# Patient Record
Sex: FEMALE | Race: OTHER | Employment: UNEMPLOYED | URBAN - METROPOLITAN AREA
[De-identification: names, ages, dates, MRNs, and addresses within clinical notes are randomized per-mention and may not be internally consistent; named-entity substitution may affect disease eponyms.]

---

## 2024-02-12 ENCOUNTER — HOSPITAL ENCOUNTER (EMERGENCY)
Age: 44
Discharge: HOME | End: 2024-02-12
Attending: EMERGENCY MEDICINE

## 2024-02-12 ENCOUNTER — APPOINTMENT (OUTPATIENT)
Dept: ULTRASOUND IMAGING | Age: 44
End: 2024-02-12

## 2024-02-12 VITALS
SYSTOLIC BLOOD PRESSURE: 114 MMHG | DIASTOLIC BLOOD PRESSURE: 81 MMHG | HEART RATE: 90 BPM | RESPIRATION RATE: 17 BRPM | OXYGEN SATURATION: 100 % | TEMPERATURE: 99 F

## 2024-02-12 DIAGNOSIS — E11.9 TYPE 2 DIABETES MELLITUS WITHOUT COMPLICATION, WITHOUT LONG-TERM CURRENT USE OF INSULIN (HCC): ICD-10-CM

## 2024-02-12 DIAGNOSIS — D64.9 ANEMIA, UNSPECIFIED TYPE: ICD-10-CM

## 2024-02-12 DIAGNOSIS — R55 SYNCOPE AND COLLAPSE: ICD-10-CM

## 2024-02-12 DIAGNOSIS — R60.0 BILATERAL LOWER EXTREMITY EDEMA: Primary | ICD-10-CM

## 2024-02-12 LAB
ALBUMIN SERPL-MCNC: 1.9 G/DL (ref 3.5–5)
ALBUMIN/GLOB SERPL: 0.5 (ref 0.4–1.6)
ALP SERPL-CCNC: 99 U/L (ref 50–136)
ALT SERPL-CCNC: 15 U/L (ref 12–65)
ANION GAP SERPL CALC-SCNC: 6 MMOL/L (ref 2–11)
AST SERPL-CCNC: 12 U/L (ref 15–37)
BASOPHILS # BLD: 0 K/UL (ref 0–0.2)
BASOPHILS NFR BLD: 1 % (ref 0–2)
BILIRUB SERPL-MCNC: 0.2 MG/DL (ref 0.2–1.1)
BUN SERPL-MCNC: 19 MG/DL (ref 6–23)
CALCIUM SERPL-MCNC: 8.4 MG/DL (ref 8.3–10.4)
CHLORIDE SERPL-SCNC: 107 MMOL/L (ref 103–113)
CO2 SERPL-SCNC: 26 MMOL/L (ref 21–32)
CREAT SERPL-MCNC: 0.7 MG/DL (ref 0.6–1)
DIFFERENTIAL METHOD BLD: ABNORMAL
EKG ATRIAL RATE: 94 BPM
EKG DIAGNOSIS: NORMAL
EKG P AXIS: 37 DEGREES
EKG P-R INTERVAL: 172 MS
EKG Q-T INTERVAL: 380 MS
EKG QRS DURATION: 86 MS
EKG QTC CALCULATION (BAZETT): 475 MS
EKG R AXIS: 104 DEGREES
EKG T AXIS: 19 DEGREES
EKG VENTRICULAR RATE: 94 BPM
EOSINOPHIL # BLD: 0.1 K/UL (ref 0–0.8)
EOSINOPHIL NFR BLD: 1 % (ref 0.5–7.8)
ERYTHROCYTE [DISTWIDTH] IN BLOOD BY AUTOMATED COUNT: 13.1 % (ref 11.9–14.6)
GLOBULIN SER CALC-MCNC: 4 G/DL (ref 2.8–4.5)
GLUCOSE SERPL-MCNC: 288 MG/DL (ref 65–100)
HCT VFR BLD AUTO: 28.4 % (ref 35.8–46.3)
HGB BLD-MCNC: 9.1 G/DL (ref 11.7–15.4)
IMM GRANULOCYTES # BLD AUTO: 0 K/UL (ref 0–0.5)
IMM GRANULOCYTES NFR BLD AUTO: 0 % (ref 0–5)
LYMPHOCYTES # BLD: 2.3 K/UL (ref 0.5–4.6)
LYMPHOCYTES NFR BLD: 27 % (ref 13–44)
MCH RBC QN AUTO: 27.7 PG (ref 26.1–32.9)
MCHC RBC AUTO-ENTMCNC: 32 G/DL (ref 31.4–35)
MCV RBC AUTO: 86.6 FL (ref 82–102)
MONOCYTES # BLD: 0.5 K/UL (ref 0.1–1.3)
MONOCYTES NFR BLD: 6 % (ref 4–12)
NEUTS SEG # BLD: 5.7 K/UL (ref 1.7–8.2)
NEUTS SEG NFR BLD: 65 % (ref 43–78)
NRBC # BLD: 0 K/UL (ref 0–0.2)
NT PRO BNP: 1001 PG/ML (ref 5–125)
PLATELET # BLD AUTO: 271 K/UL (ref 150–450)
PMV BLD AUTO: 10.6 FL (ref 9.4–12.3)
POTASSIUM SERPL-SCNC: 3.7 MMOL/L (ref 3.5–5.1)
PROT SERPL-MCNC: 5.9 G/DL (ref 6.3–8.2)
RBC # BLD AUTO: 3.28 M/UL (ref 4.05–5.2)
SODIUM SERPL-SCNC: 139 MMOL/L (ref 136–146)
WBC # BLD AUTO: 8.6 K/UL (ref 4.3–11.1)

## 2024-02-12 PROCEDURE — 93970 EXTREMITY STUDY: CPT

## 2024-02-12 PROCEDURE — 85025 COMPLETE CBC W/AUTO DIFF WBC: CPT

## 2024-02-12 PROCEDURE — 93005 ELECTROCARDIOGRAM TRACING: CPT | Performed by: EMERGENCY MEDICINE

## 2024-02-12 PROCEDURE — 93970 EXTREMITY STUDY: CPT | Performed by: RADIOLOGY

## 2024-02-12 PROCEDURE — 99284 EMERGENCY DEPT VISIT MOD MDM: CPT

## 2024-02-12 PROCEDURE — 93010 ELECTROCARDIOGRAM REPORT: CPT | Performed by: INTERNAL MEDICINE

## 2024-02-12 PROCEDURE — 80053 COMPREHEN METABOLIC PANEL: CPT

## 2024-02-12 PROCEDURE — 83880 ASSAY OF NATRIURETIC PEPTIDE: CPT

## 2024-02-12 RX ORDER — HYDROCHLOROTHIAZIDE 25 MG/1
25 TABLET ORAL EVERY MORNING
Qty: 30 TABLET | Refills: 1 | Status: SHIPPED | OUTPATIENT
Start: 2024-02-12 | End: 2024-04-12

## 2024-02-12 NOTE — PROGRESS NOTES
Patient/caregivers speak [place]  as their preferred language for their healthcare communication. For safe communication, use the [facility]  carts or call:    Senior /Navigator Dee Trevino at [phone] or   [employer] phone services for [facility] at [phone]    General phone: [phone] ( [phone])  Email: [email or web address]    Always document the use of interpreting services ('s ID number) in your clinical notes.    Our interpreters are available for team members working with limited  English proficient (LEP) patients remotely, via phone or video or in person (if needed for special cases).    When using family members to interpret, for the safety of the patient and protection of the communication of both our patient and [facility] staff the VRI or telephonic  should remain on the line to monitor that all communication is accurate and complete. The  should be instructed to notify [facility] staff immediately if there are any inaccuracies.         Thank you,        Dee JAIME  Senior /Navigator

## 2024-02-12 NOTE — ED PROVIDER NOTES
General: No focal deficit present.      Mental Status: She is alert.          Procedures     Procedures    Orders Placed This Encounter   Procedures    CBC with Auto Differential    Brain Natriuretic Peptide    Comprehensive Metabolic Panel    [facility]    EKG 12 Lead    Insert peripheral IV    Vascular duplex lower extremity venous bilateral        Medications given during this emergency department visit:  Medications - No data to display    Discharge Medication List as of 2/12/2024  2:37 PM        START taking these medications    Details   hydroCHLOROthiazide (HYDRODIURIL) 25 MG tablet Take 1 tablet by mouth every morning, Disp-30 tablet, R-1Print      metFORMIN (GLUCOPHAGE) 500 MG tablet Take 1 tablet by mouth 2 times daily (with meals), Disp-60 tablet, R-1Print              No past medical history on file.     No past surgical history on file.     Social History     Socioeconomic History    Marital status:         Discharge Medication List as of 2/12/2024  2:37 PM           Results for orders placed or performed during the hospital encounter of 02/12/24   CBC with Auto Differential   Result Value Ref Range    WBC 8.6 4.3 - 11.1 K/uL    RBC 3.28 (L) 4.05 - 5.2 M/uL    Hemoglobin 9.1 (L) 11.7 - 15.4 g/dL    Hematocrit 28.4 (L) 35.8 - 46.3 %    MCV 86.6 82 - 102 FL    MCH 27.7 26.1 - 32.9 PG    MCHC 32.0 31.4 - 35.0 g/dL    RDW 13.1 11.9 - 14.6 %    Platelets 271 150 - 450 K/uL    MPV 10.6 9.4 - 12.3 FL    nRBC 0.00 0.0 - 0.2 K/uL    Differential Type AUTOMATED      Neutrophils % 65 43 - 78 %    Lymphocytes % 27 13 - 44 %    Monocytes % 6 4.0 - 12.0 %    Eosinophils % 1 0.5 - 7.8 %    Basophils % 1 0.0 - 2.0 %    Immature Granulocytes 0 0.0 - 5.0 %    Neutrophils Absolute 5.7 1.7 - 8.2 K/UL    Lymphocytes Absolute 2.3 0.5 - 4.6 K/UL    Monocytes Absolute 0.5 0.1 - 1.3 K/UL    Eosinophils Absolute 0.1 0.0 - 0.8 K/UL    Basophils Absolute 0.0 0.0 - 0.2 K/UL

## 2024-02-12 NOTE — ED NOTES
Mail order faxed in request for medication refill. Medication(s) set up as pending orders from medication list.    Preferred pharmacy has been set up and verified            Preferred contact number#  [facility] [phone]    Prescription request for 90 day supply directions and signature required.       Needs bed for I&D     Megan Quevedo, RN  02/12/24 6000

## 2024-02-12 NOTE — DISCHARGE INSTRUCTIONS
Medications as directed to lower blood sugar.  Consider multiple vitamin with iron regarding your anemia.  Important to call for follow-up appointments.

## 2024-02-12 NOTE — ED NOTES
I have reviewed discharge instructions with the patient and caregiver.  The patient and caregiver verbalized understanding.    Patient left ED via Discharge Method: ambulatory to Home with (family).    Opportunity for questions and clarification provided.       Patient given 2 scripts.   No esign      To continue your aftercare when you leave the hospital, you may receive an automated call from our care team to check in on how you are doing.  This is a free service and part of our promise to provide the best care and service to meet your aftercare needs.” If you have questions, or wish to unsubscribe from this service please call [phone].  Thank you for Choosing our [facility] Emergency Department.       Cassidy Cali, RN  02/12/24 4459

## 2024-02-12 NOTE — ED TRIAGE NOTES
Pt reports bilateral foot pain and swelling that began 2 weeks ago.  Pt reports intermittent cramps in calves.

## 2024-02-13 ENCOUNTER — TELEPHONE (OUTPATIENT)
Dept: INTERNAL MEDICINE CLINIC | Facility: CLINIC | Age: 44
End: 2024-02-13

## 2024-02-13 NOTE — TELEPHONE ENCOUNTER
Made the first attempt to contact the patient using the phone number listed in chart to schedule appointment with our office today. Called through Language services Session Code [id number],  [id number]/ No answer  called x 2 alternate number

## 2024-02-14 NOTE — TELEPHONE ENCOUNTER
Made the second attempt to contact the patient using the phone number listed in chart to schedule appointment with our office today. Called through Language services Session Code [id number]   ID [id number] Patient number not in service/ alternate number no answer unable to LVM

## 2024-02-15 NOTE — CARE COORDINATION
SW attempted to contact patient to advise of cardiology appointment Dr. Waggoner placed yesterday. No answer. Unable to LM.     Mary Queen LMSW    [facility]

## 2024-02-15 NOTE — TELEPHONE ENCOUNTER
Made the third attempt to contact the patient using the phone number listed in chart to schedule appointment with our office today. Called through Language services Session Code [id number]   ID [id number] Patient number not in service/ alternate number no answer unable to LVM

## 2025-06-19 ENCOUNTER — TRANSCRIBE ORDERS (OUTPATIENT)
Dept: SCHEDULING | Age: 45
End: 2025-06-19

## 2025-06-19 DIAGNOSIS — R14.0 ABDOMINAL DISTENSION (GASEOUS): Primary | ICD-10-CM

## 2025-06-25 ENCOUNTER — HOSPITAL ENCOUNTER (INPATIENT)
Age: 45
LOS: 6 days | Discharge: HOME | DRG: 683 | End: 2025-07-01
Attending: EMERGENCY MEDICINE | Admitting: FAMILY MEDICINE
Payer: MEDICAID

## 2025-06-25 ENCOUNTER — APPOINTMENT (OUTPATIENT)
Dept: ULTRASOUND IMAGING | Age: 45
DRG: 683 | End: 2025-06-25
Payer: MEDICAID

## 2025-06-25 ENCOUNTER — APPOINTMENT (OUTPATIENT)
Dept: CT IMAGING | Age: 45
DRG: 683 | End: 2025-06-25
Payer: MEDICAID

## 2025-06-25 DIAGNOSIS — D64.9 ACUTE ON CHRONIC ANEMIA: ICD-10-CM

## 2025-06-25 DIAGNOSIS — R60.1 ANASARCA: ICD-10-CM

## 2025-06-25 DIAGNOSIS — N28.9 ACUTE RENAL INSUFFICIENCY: Primary | ICD-10-CM

## 2025-06-25 DIAGNOSIS — R60.0 EDEMA OF LEFT UPPER EXTREMITY: ICD-10-CM

## 2025-06-25 PROBLEM — I10 HTN (HYPERTENSION): Status: ACTIVE | Noted: 2025-06-25

## 2025-06-25 PROBLEM — N17.9 AKI (ACUTE KIDNEY INJURY): Status: ACTIVE | Noted: 2025-06-25

## 2025-06-25 PROBLEM — E11.9 DIABETES MELLITUS, TYPE II (HCC): Status: ACTIVE | Noted: 2025-06-25

## 2025-06-25 PROBLEM — E87.20 METABOLIC ACIDOSIS: Status: ACTIVE | Noted: 2025-06-25

## 2025-06-25 LAB
ALBUMIN SERPL-MCNC: 1.4 G/DL (ref 3.5–5)
ALBUMIN/GLOB SERPL: 0.3 (ref 1–1.9)
ALP SERPL-CCNC: 102 U/L (ref 35–104)
ALT SERPL-CCNC: 17 U/L (ref 8–45)
ANION GAP SERPL CALC-SCNC: 7 MMOL/L (ref 7–16)
APPEARANCE UR: ABNORMAL
AST SERPL-CCNC: 17 U/L (ref 15–37)
BACTERIA URNS QL MICRO: ABNORMAL /HPF
BASOPHILS # BLD: 0.04 K/UL (ref 0–0.2)
BASOPHILS NFR BLD: 0.5 % (ref 0–2)
BILIRUB SERPL-MCNC: <0.2 MG/DL (ref 0–1.2)
BILIRUB UR QL: NEGATIVE
BUN SERPL-MCNC: 43 MG/DL (ref 6–23)
CALCIUM SERPL-MCNC: 7.3 MG/DL (ref 8.8–10.2)
CASTS URNS QL MICRO: 0 /LPF
CHLORIDE SERPL-SCNC: 113 MMOL/L (ref 98–107)
CO2 SERPL-SCNC: 15 MMOL/L (ref 20–29)
COLOR UR: ABNORMAL
CREAT SERPL-MCNC: 2.22 MG/DL (ref 0.6–1.1)
CRYSTALS URNS QL MICRO: ABNORMAL /LPF
DIFFERENTIAL METHOD BLD: ABNORMAL
EOSINOPHIL # BLD: 0.24 K/UL (ref 0–0.8)
EOSINOPHIL NFR BLD: 3.2 % (ref 0.5–7.8)
EPI CELLS #/AREA URNS HPF: ABNORMAL /HPF
ERYTHROCYTE [DISTWIDTH] IN BLOOD BY AUTOMATED COUNT: 12.8 % (ref 11.9–14.6)
EST. AVERAGE GLUCOSE BLD GHB EST-MCNC: 110 MG/DL
GLOBULIN SER CALC-MCNC: 4.1 G/DL (ref 2.3–3.5)
GLUCOSE SERPL-MCNC: 89 MG/DL (ref 70–99)
GLUCOSE UR STRIP.AUTO-MCNC: NEGATIVE MG/DL
HBA1C MFR BLD: 5.5 % (ref 0–5.6)
HCT VFR BLD AUTO: 20.3 % (ref 35.8–46.3)
HCT VFR BLD AUTO: 24.3 % (ref 35.8–46.3)
HGB BLD-MCNC: 6.5 G/DL (ref 11.7–15.4)
HGB BLD-MCNC: 7.8 G/DL (ref 11.7–15.4)
HGB UR QL STRIP: ABNORMAL
HISTORY CHECK: NORMAL
IMM GRANULOCYTES # BLD AUTO: 0.03 K/UL (ref 0–0.5)
IMM GRANULOCYTES NFR BLD AUTO: 0.4 % (ref 0–5)
IRON SATN MFR SERPL: 23 % (ref 20–50)
IRON SERPL-MCNC: 28 UG/DL (ref 35–100)
KETONES UR QL STRIP.AUTO: NEGATIVE MG/DL
LEUKOCYTE ESTERASE UR QL STRIP.AUTO: ABNORMAL
LIPASE SERPL-CCNC: 24 U/L (ref 13–60)
LYMPHOCYTES # BLD: 2.06 K/UL (ref 0.5–4.6)
LYMPHOCYTES NFR BLD: 27.2 % (ref 13–44)
MCH RBC QN AUTO: 30.8 PG (ref 26.1–32.9)
MCHC RBC AUTO-ENTMCNC: 32 G/DL (ref 31.4–35)
MCV RBC AUTO: 96.2 FL (ref 82–102)
MONOCYTES # BLD: 0.62 K/UL (ref 0.1–1.3)
MONOCYTES NFR BLD: 8.2 % (ref 4–12)
MUCOUS THREADS URNS QL MICRO: 0 /LPF
NEUTS SEG # BLD: 4.57 K/UL (ref 1.7–8.2)
NEUTS SEG NFR BLD: 60.5 % (ref 43–78)
NITRITE UR QL STRIP.AUTO: NEGATIVE
NRBC # BLD: 0 K/UL (ref 0–0.2)
OTHER OBSERVATIONS: ABNORMAL
PH UR STRIP: 5 (ref 5–9)
PLATELET # BLD AUTO: 299 K/UL (ref 150–450)
PMV BLD AUTO: 10 FL (ref 9.4–12.3)
POTASSIUM SERPL-SCNC: 4.6 MMOL/L (ref 3.5–5.1)
PROT SERPL-MCNC: 5.4 G/DL (ref 6.3–8.2)
PROT UR STRIP-MCNC: 300 MG/DL
RBC # BLD AUTO: 2.11 M/UL (ref 4.05–5.2)
RBC #/AREA URNS HPF: ABNORMAL /HPF
SODIUM SERPL-SCNC: 134 MMOL/L (ref 136–145)
SP GR UR REFRACTOMETRY: 1.01 (ref 1–1.02)
TIBC SERPL-MCNC: 118 UG/DL (ref 240–450)
UIBC SERPL-MCNC: 90.6 UG/DL (ref 112–347)
URINE CULTURE IF INDICATED: ABNORMAL
UROBILINOGEN UR QL STRIP.AUTO: 0.2 EU/DL (ref 0.2–1)
WBC # BLD AUTO: 7.6 K/UL (ref 4.3–11.1)
WBC URNS QL MICRO: ABNORMAL /HPF
YEAST URNS QL MICRO: ABNORMAL

## 2025-06-25 PROCEDURE — 6370000000 HC RX 637 (ALT 250 FOR IP): Performed by: FAMILY MEDICINE

## 2025-06-25 PROCEDURE — 2500000003 HC RX 250 WO HCPCS: Performed by: FAMILY MEDICINE

## 2025-06-25 PROCEDURE — 87186 SC STD MICRODIL/AGAR DIL: CPT

## 2025-06-25 PROCEDURE — 85018 HEMOGLOBIN: CPT

## 2025-06-25 PROCEDURE — 99285 EMERGENCY DEPT VISIT HI MDM: CPT

## 2025-06-25 PROCEDURE — 2580000003 HC RX 258: Performed by: EMERGENCY MEDICINE

## 2025-06-25 PROCEDURE — 86850 RBC ANTIBODY SCREEN: CPT

## 2025-06-25 PROCEDURE — 1100000003 HC PRIVATE W/ TELEMETRY

## 2025-06-25 PROCEDURE — 82570 ASSAY OF URINE CREATININE: CPT

## 2025-06-25 PROCEDURE — 85025 COMPLETE CBC W/AUTO DIFF WBC: CPT

## 2025-06-25 PROCEDURE — 83036 HEMOGLOBIN GLYCOSYLATED A1C: CPT

## 2025-06-25 PROCEDURE — 96360 HYDRATION IV INFUSION INIT: CPT

## 2025-06-25 PROCEDURE — 36415 COLL VENOUS BLD VENIPUNCTURE: CPT

## 2025-06-25 PROCEDURE — 84156 ASSAY OF PROTEIN URINE: CPT

## 2025-06-25 PROCEDURE — 86901 BLOOD TYPING SEROLOGIC RH(D): CPT

## 2025-06-25 PROCEDURE — 80053 COMPREHEN METABOLIC PANEL: CPT

## 2025-06-25 PROCEDURE — P9016 RBC LEUKOCYTES REDUCED: HCPCS

## 2025-06-25 PROCEDURE — 87088 URINE BACTERIA CULTURE: CPT

## 2025-06-25 PROCEDURE — 86900 BLOOD TYPING SEROLOGIC ABO: CPT

## 2025-06-25 PROCEDURE — 6360000002 HC RX W HCPCS: Performed by: FAMILY MEDICINE

## 2025-06-25 PROCEDURE — 83690 ASSAY OF LIPASE: CPT

## 2025-06-25 PROCEDURE — 85014 HEMATOCRIT: CPT

## 2025-06-25 PROCEDURE — 86923 COMPATIBILITY TEST ELECTRIC: CPT

## 2025-06-25 PROCEDURE — 83550 IRON BINDING TEST: CPT

## 2025-06-25 PROCEDURE — 83540 ASSAY OF IRON: CPT

## 2025-06-25 PROCEDURE — 74176 CT ABD & PELVIS W/O CONTRAST: CPT

## 2025-06-25 PROCEDURE — 87086 URINE CULTURE/COLONY COUNT: CPT

## 2025-06-25 PROCEDURE — 81001 URINALYSIS AUTO W/SCOPE: CPT

## 2025-06-25 PROCEDURE — 2580000003 HC RX 258: Performed by: FAMILY MEDICINE

## 2025-06-25 PROCEDURE — 36430 TRANSFUSION BLD/BLD COMPNT: CPT

## 2025-06-25 PROCEDURE — 30233N1 TRANSFUSION OF NONAUTOLOGOUS RED BLOOD CELLS INTO PERIPHERAL VEIN, PERCUTANEOUS APPROACH: ICD-10-PCS | Performed by: INTERNAL MEDICINE

## 2025-06-25 RX ORDER — MAGNESIUM SULFATE IN WATER 40 MG/ML
2000 INJECTION, SOLUTION INTRAVENOUS PRN
Status: DISCONTINUED | OUTPATIENT
Start: 2025-06-25 | End: 2025-07-01 | Stop reason: HOSPADM

## 2025-06-25 RX ORDER — ACETAMINOPHEN 325 MG/1
650 TABLET ORAL EVERY 6 HOURS PRN
Status: DISCONTINUED | OUTPATIENT
Start: 2025-06-25 | End: 2025-07-01 | Stop reason: HOSPADM

## 2025-06-25 RX ORDER — MAGNESIUM HYDROXIDE/ALUMINUM HYDROXICE/SIMETHICONE 120; 1200; 1200 MG/30ML; MG/30ML; MG/30ML
30 SUSPENSION ORAL EVERY 6 HOURS PRN
Status: DISCONTINUED | OUTPATIENT
Start: 2025-06-25 | End: 2025-07-01 | Stop reason: HOSPADM

## 2025-06-25 RX ORDER — FERROUS SULFATE 325(65) MG
325 TABLET ORAL
Status: ON HOLD | COMMUNITY
End: 2025-07-01

## 2025-06-25 RX ORDER — SODIUM CHLORIDE 9 MG/ML
INJECTION, SOLUTION INTRAVENOUS PRN
Status: DISCONTINUED | OUTPATIENT
Start: 2025-06-25 | End: 2025-07-01 | Stop reason: HOSPADM

## 2025-06-25 RX ORDER — ONDANSETRON 2 MG/ML
4 INJECTION INTRAMUSCULAR; INTRAVENOUS EVERY 6 HOURS PRN
Status: DISCONTINUED | OUTPATIENT
Start: 2025-06-25 | End: 2025-06-25

## 2025-06-25 RX ORDER — SODIUM BICARBONATE 650 MG/1
650 TABLET ORAL 4 TIMES DAILY
Status: DISCONTINUED | OUTPATIENT
Start: 2025-06-25 | End: 2025-06-25

## 2025-06-25 RX ORDER — POTASSIUM CHLORIDE 7.45 MG/ML
10 INJECTION INTRAVENOUS PRN
Status: DISCONTINUED | OUTPATIENT
Start: 2025-06-25 | End: 2025-07-01 | Stop reason: HOSPADM

## 2025-06-25 RX ORDER — SODIUM CHLORIDE 0.9 % (FLUSH) 0.9 %
5-40 SYRINGE (ML) INJECTION PRN
Status: DISCONTINUED | OUTPATIENT
Start: 2025-06-25 | End: 2025-07-01 | Stop reason: HOSPADM

## 2025-06-25 RX ORDER — LISINOPRIL 5 MG/1
2.5 TABLET ORAL DAILY
Status: DISCONTINUED | OUTPATIENT
Start: 2025-06-25 | End: 2025-06-29

## 2025-06-25 RX ORDER — POLYETHYLENE GLYCOL 3350 17 G/17G
17 POWDER, FOR SOLUTION ORAL DAILY PRN
Status: DISCONTINUED | OUTPATIENT
Start: 2025-06-25 | End: 2025-07-01 | Stop reason: HOSPADM

## 2025-06-25 RX ORDER — HYDRALAZINE HYDROCHLORIDE 20 MG/ML
20 INJECTION INTRAMUSCULAR; INTRAVENOUS EVERY 6 HOURS PRN
Status: DISCONTINUED | OUTPATIENT
Start: 2025-06-25 | End: 2025-06-29

## 2025-06-25 RX ORDER — SODIUM CHLORIDE 0.9 % (FLUSH) 0.9 %
5-40 SYRINGE (ML) INJECTION EVERY 12 HOURS SCHEDULED
Status: DISCONTINUED | OUTPATIENT
Start: 2025-06-25 | End: 2025-07-01 | Stop reason: HOSPADM

## 2025-06-25 RX ORDER — ACETAMINOPHEN 650 MG/1
650 SUPPOSITORY RECTAL EVERY 6 HOURS PRN
Status: DISCONTINUED | OUTPATIENT
Start: 2025-06-25 | End: 2025-07-01 | Stop reason: HOSPADM

## 2025-06-25 RX ORDER — BISACODYL 10 MG
10 SUPPOSITORY, RECTAL RECTAL DAILY PRN
Status: DISCONTINUED | OUTPATIENT
Start: 2025-06-25 | End: 2025-07-01 | Stop reason: HOSPADM

## 2025-06-25 RX ORDER — FAMOTIDINE 10 MG
10 TABLET ORAL DAILY PRN
Status: DISCONTINUED | OUTPATIENT
Start: 2025-06-25 | End: 2025-07-01 | Stop reason: HOSPADM

## 2025-06-25 RX ORDER — FUROSEMIDE 10 MG/ML
40 INJECTION INTRAMUSCULAR; INTRAVENOUS DAILY
Status: DISCONTINUED | OUTPATIENT
Start: 2025-06-25 | End: 2025-06-28

## 2025-06-25 RX ORDER — ONDANSETRON 4 MG/1
4 TABLET, ORALLY DISINTEGRATING ORAL EVERY 8 HOURS PRN
Status: DISCONTINUED | OUTPATIENT
Start: 2025-06-25 | End: 2025-06-25

## 2025-06-25 RX ORDER — POTASSIUM CHLORIDE 1500 MG/1
40 TABLET, EXTENDED RELEASE ORAL PRN
Status: DISCONTINUED | OUTPATIENT
Start: 2025-06-25 | End: 2025-07-01 | Stop reason: HOSPADM

## 2025-06-25 RX ORDER — SODIUM CHLORIDE 9 MG/ML
INJECTION, SOLUTION INTRAVENOUS CONTINUOUS
Status: DISCONTINUED | OUTPATIENT
Start: 2025-06-25 | End: 2025-06-25

## 2025-06-25 RX ORDER — 0.9 % SODIUM CHLORIDE 0.9 %
1000 INTRAVENOUS SOLUTION INTRAVENOUS ONCE
Status: COMPLETED | OUTPATIENT
Start: 2025-06-25 | End: 2025-06-25

## 2025-06-25 RX ADMIN — SODIUM CHLORIDE, PRESERVATIVE FREE 10 ML: 5 INJECTION INTRAVENOUS at 22:21

## 2025-06-25 RX ADMIN — FUROSEMIDE 40 MG: 10 INJECTION, SOLUTION INTRAMUSCULAR; INTRAVENOUS at 22:15

## 2025-06-25 RX ADMIN — CEFTRIAXONE SODIUM 2000 MG: 2 INJECTION, POWDER, FOR SOLUTION INTRAMUSCULAR; INTRAVENOUS at 22:15

## 2025-06-25 RX ADMIN — SODIUM CHLORIDE 40 MG: 9 INJECTION INTRAMUSCULAR; INTRAVENOUS; SUBCUTANEOUS at 22:21

## 2025-06-25 RX ADMIN — LISINOPRIL 2.5 MG: 5 TABLET ORAL at 22:15

## 2025-06-25 RX ADMIN — SODIUM CHLORIDE 1000 ML: 0.9 INJECTION, SOLUTION INTRAVENOUS at 17:20

## 2025-06-25 ASSESSMENT — PAIN SCALES - GENERAL
PAINLEVEL_OUTOF10: 0
PAINLEVEL_OUTOF10: 0

## 2025-06-25 ASSESSMENT — PAIN - FUNCTIONAL ASSESSMENT: PAIN_FUNCTIONAL_ASSESSMENT: 0-10

## 2025-06-25 ASSESSMENT — LIFESTYLE VARIABLES
HOW MANY STANDARD DRINKS CONTAINING ALCOHOL DO YOU HAVE ON A TYPICAL DAY: PATIENT DOES NOT DRINK
HOW OFTEN DO YOU HAVE A DRINK CONTAINING ALCOHOL: NEVER

## 2025-06-25 NOTE — H&P
Hospitalist History and Physical   Admit Date:  2025  3:04 PM   Name:  Monserrat Alvarado   Age:  45 y.o.  Sex:  female  :  1980   MRN:  [id number]   [facility]    Presenting/Chief Complaint: Abdominal Pain and Abnormal Lab     Reason(s) for Admission: Symptomatic anemia [D64.9]     History of Present Illness:   Monserrat Alvarado is a 45 y.o. female who presented to the ED for cc abdominal pain, fatigue, and SOB over the past two weeks. Nothing seems to make better or worse. Denies any obvious bleeding    Hg 6.5 from 9.1 in 2024. Denies antiplatelets, anticoagulants, or NSAIDs.     Hx of DM type II, RICHARD, HTN  Assessment & Plan:     Active Problems:    Symptomatic anemia - No obvious bleeding. Iron studies. Remote tele. Trend Hg. 1 unit PRBC ordered in ER. BUN is elevated, but she denies any obvious blood or NSAID use. PPI IV daily and consult GI to see in AM just to ensure no need for endoscopy. Clears today and NPO past midnight.     CARLOS EDUARDO - Holding her jardiance, metformin, and HCTZ. Renal US. Consult Nephrology to see in the AM due to the extent of her CARLOS EDUARDO. Strict Is and Os. Possibly from her anemia? UA pending to look for protein--nephrotic syndrome? Check lipid panel. A1C pending.     Metabolic acidosis - Na bicarb    Anasarca - Possibly from her CARLOS EDUARDO. Holding off on further IV fluids until I have UA back. If this is more concerning of nephrotic syndrome, she will need diuretics    Air in bladder - UA pending. Patient states she is able to void.     DM type II - SS low dose since glucose 89. Check A1C    HTN - PRN hydralazine.     PT/OT evals ordered?  Not ordered; patient not expected to need rehab  Diet: ADULT DIET; Clear Liquid  Diet NPO  VTE prophylaxis: SCD's   Code status: Full Code  Code status discussed: No  Blood consent obtained: No      Non-peripheral Lines and Tubes (if present):             Hospital Problems:  Principal Problem:    Symptomatic anemia  Active Problems:    CARLOS EDUARDO

## 2025-06-25 NOTE — ED TRIAGE NOTES
Patient arrives to ED complaining of abdominal pain/bloating. Patient reports she has history of anemia and takes iron pills. Patient was told by [facility] that she needed a blood transfusion.

## 2025-06-25 NOTE — ED PROVIDER NOTES
Emergency Department Provider Note       [facility] EMERGENCY DEPT   PCP: None, None   Age: 45 y.o.   Sex: female     DISPOSITION Decision To Admit 06/25/2025 06:32:44 PM    ICD-10-CM    1. Acute renal insufficiency  N28.9       2. Acute on chronic anemia  D64.9           Medical Decision Making       ED Course as of 06/25/25 1832 Wed Jun 25, 2025   1529 Patient is a 45-year-old female that comes to the emergency department with complaint of anemia and abdominal pain.  Patient reports that she was seen at [facility] for anemia.  She was started on iron supplementation.  She was told that she needed a blood transfusion so came to the ER.  On arrival she is hemodynamically stable and not in any distress.  Abdominal pain seems to started when she initiated the iron supplements.  This could be related to constipation or other medication side effect.  Workup will be performed for any signs of significant anemia that would require transfusion in the ER, infectious abdominal process, or surgical process. [KAYLI]   1621 Patient's hemoglobin is 6.5.  She has been ordered a unit of packed red blood cells.  Plan was to do a CT GI bleed study but she also has acute renal insufficiency with a creatinine of 2.22 and overall GFR of 27.  This is also acute from 1 year ago.  She has been ordered IV fluids in addition to the blood.  Patient will require admission. [KAYLI]      ED Course User Index  [KAYLI] Steven Rojas, DO     1 or more acute illnesses that pose a threat to life or bodily function.   Discussion with external consultants.  Shared medical decision making was utilized in creating the patients health plan today.  I independently ordered and reviewed each unique test.           I interpreted the CT Scan CT abdomen pelvis negative for any infectious or obstructive process.              History     Patient is a 45-year-old female comes the emergency department for evaluation of anemia and abdominal pain.  Patient reports

## 2025-06-25 NOTE — CONSENT
Informed Consent for Blood Component Transfusion Note    I have discussed with the patient the rationale for blood component transfusion; its benefits in treating or preventing fatigue, organ damage, or death; and its risk which includes mild transfusion reactions, rare risk of blood borne infection, or more serious but rare reactions. I have discussed the alternatives to transfusion, including the risk and consequences of not receiving transfusion. The patient had an opportunity to ask questions and had agreed to proceed with transfusion of blood components.    Electronically signed by Steven Rojas DO on 6/25/25 at 4:08 PM EDT

## 2025-06-25 NOTE — CONSENT
Informed Consent for Blood Component Transfusion Note    I have discussed with the patient the rationale for blood component transfusion; its benefits in treating or preventing fatigue, organ damage, or death; and its risk which includes mild transfusion reactions, rare risk of blood borne infection, or more serious but rare reactions. I have discussed the alternatives to transfusion, including the risk and consequences of not receiving transfusion. The patient had an opportunity to ask questions and had agreed to proceed with transfusion of blood components.    Daughter also in room    Electronically signed by BETTY APONTE DO on 6/25/25 at 6:36 PM EDT

## 2025-06-26 ENCOUNTER — APPOINTMENT (OUTPATIENT)
Dept: ULTRASOUND IMAGING | Age: 45
DRG: 683 | End: 2025-06-26
Payer: MEDICAID

## 2025-06-26 PROBLEM — N28.9 ACUTE RENAL INSUFFICIENCY: Status: ACTIVE | Noted: 2025-06-26

## 2025-06-26 LAB
ABO + RH BLD: NORMAL
ANION GAP SERPL CALC-SCNC: 9 MMOL/L (ref 7–16)
BASOPHILS # BLD: 0.04 K/UL (ref 0–0.2)
BASOPHILS NFR BLD: 0.5 % (ref 0–2)
BLD PROD TYP BPU: NORMAL
BLOOD BANK BLOOD PRODUCT EXPIRATION DATE: NORMAL
BLOOD BANK DISPENSE STATUS: NORMAL
BLOOD BANK ISBT PRODUCT BLOOD TYPE: 5100
BLOOD BANK PRODUCT CODE: NORMAL
BLOOD BANK UNIT TYPE AND RH: NORMAL
BLOOD GROUP ANTIBODIES SERPL: NORMAL
BPU ID: NORMAL
BUN SERPL-MCNC: 42 MG/DL (ref 6–23)
CALCIUM SERPL-MCNC: 7.2 MG/DL (ref 8.8–10.2)
CHLORIDE SERPL-SCNC: 114 MMOL/L (ref 98–107)
CHOLEST SERPL-MCNC: 221 MG/DL (ref 0–200)
CO2 SERPL-SCNC: 13 MMOL/L (ref 20–29)
CREAT SERPL-MCNC: 2.01 MG/DL (ref 0.6–1.1)
CREAT UR-MCNC: 40.1 MG/DL (ref 28–217)
CROSSMATCH RESULT: NORMAL
DIFFERENTIAL METHOD BLD: ABNORMAL
EOSINOPHIL # BLD: 0.21 K/UL (ref 0–0.8)
EOSINOPHIL NFR BLD: 2.5 % (ref 0.5–7.8)
ERYTHROCYTE [DISTWIDTH] IN BLOOD BY AUTOMATED COUNT: 14.5 % (ref 11.9–14.6)
FERRITIN SERPL-MCNC: 130 NG/ML (ref 8–388)
FOLATE SERPL-MCNC: 6.3 NG/ML (ref 3.1–17.5)
GLUCOSE BLD STRIP.AUTO-MCNC: 122 MG/DL (ref 65–100)
GLUCOSE BLD STRIP.AUTO-MCNC: 97 MG/DL (ref 65–100)
GLUCOSE SERPL-MCNC: 83 MG/DL (ref 70–99)
HAV IGM SER QL: NONREACTIVE
HBV CORE IGM SER QL: NONREACTIVE
HBV SURFACE AG SER QL: NONREACTIVE
HCT VFR BLD AUTO: 26.7 % (ref 35.8–46.3)
HCV AB SER QL: NONREACTIVE
HDLC SERPL-MCNC: 47 MG/DL (ref 40–60)
HDLC SERPL: 4.7 (ref 0–5)
HGB BLD-MCNC: 8.4 G/DL (ref 11.7–15.4)
IMM GRANULOCYTES # BLD AUTO: 0.03 K/UL (ref 0–0.5)
IMM GRANULOCYTES NFR BLD AUTO: 0.4 % (ref 0–5)
LDLC SERPL CALC-MCNC: 142 MG/DL (ref 0–100)
LYMPHOCYTES # BLD: 1.7 K/UL (ref 0.5–4.6)
LYMPHOCYTES NFR BLD: 20.5 % (ref 13–44)
MCH RBC QN AUTO: 29.7 PG (ref 26.1–32.9)
MCHC RBC AUTO-ENTMCNC: 31.5 G/DL (ref 31.4–35)
MCV RBC AUTO: 94.3 FL (ref 82–102)
MONOCYTES # BLD: 0.57 K/UL (ref 0.1–1.3)
MONOCYTES NFR BLD: 6.9 % (ref 4–12)
NEUTS SEG # BLD: 5.75 K/UL (ref 1.7–8.2)
NEUTS SEG NFR BLD: 69.2 % (ref 43–78)
NRBC # BLD: 0 K/UL (ref 0–0.2)
PLATELET # BLD AUTO: 309 K/UL (ref 150–450)
PMV BLD AUTO: 9.7 FL (ref 9.4–12.3)
POTASSIUM SERPL-SCNC: 4.3 MMOL/L (ref 3.5–5.1)
PROT UR-MCNC: >600 MG/DL
PROT/CREAT UR-RTO: NORMAL
RBC # BLD AUTO: 2.83 M/UL (ref 4.05–5.2)
SERVICE CMNT-IMP: ABNORMAL
SERVICE CMNT-IMP: NORMAL
SODIUM SERPL-SCNC: 136 MMOL/L (ref 136–145)
SPECIMEN EXP DATE BLD: NORMAL
T PALLIDUM AB SER QL IA: NONREACTIVE
TRIGL SERPL-MCNC: 160 MG/DL (ref 0–150)
UNIT DIVISION: 0
UNIT ISSUE DATE/TIME: NORMAL
VIT B12 SERPL-MCNC: 167 PG/ML (ref 193–986)
VLDLC SERPL CALC-MCNC: 32 MG/DL (ref 6–23)
WBC # BLD AUTO: 8.3 K/UL (ref 4.3–11.1)

## 2025-06-26 PROCEDURE — 99222 1ST HOSP IP/OBS MODERATE 55: CPT | Performed by: INTERNAL MEDICINE

## 2025-06-26 PROCEDURE — 86038 ANTINUCLEAR ANTIBODIES: CPT

## 2025-06-26 PROCEDURE — 82728 ASSAY OF FERRITIN: CPT

## 2025-06-26 PROCEDURE — 86160 COMPLEMENT ANTIGEN: CPT

## 2025-06-26 PROCEDURE — 36415 COLL VENOUS BLD VENIPUNCTURE: CPT

## 2025-06-26 PROCEDURE — 82595 ASSAY OF CRYOGLOBULIN: CPT

## 2025-06-26 PROCEDURE — 82962 GLUCOSE BLOOD TEST: CPT

## 2025-06-26 PROCEDURE — 84165 PROTEIN E-PHORESIS SERUM: CPT

## 2025-06-26 PROCEDURE — 83516 IMMUNOASSAY NONANTIBODY: CPT

## 2025-06-26 PROCEDURE — 76770 US EXAM ABDO BACK WALL COMP: CPT

## 2025-06-26 PROCEDURE — 6360000002 HC RX W HCPCS: Performed by: FAMILY MEDICINE

## 2025-06-26 PROCEDURE — 82607 VITAMIN B-12: CPT

## 2025-06-26 PROCEDURE — 86780 TREPONEMA PALLIDUM: CPT

## 2025-06-26 PROCEDURE — 6370000000 HC RX 637 (ALT 250 FOR IP): Performed by: FAMILY MEDICINE

## 2025-06-26 PROCEDURE — 2580000003 HC RX 258: Performed by: FAMILY MEDICINE

## 2025-06-26 PROCEDURE — 86037 ANCA TITER EACH ANTIBODY: CPT

## 2025-06-26 PROCEDURE — 80074 ACUTE HEPATITIS PANEL: CPT

## 2025-06-26 PROCEDURE — 84155 ASSAY OF PROTEIN SERUM: CPT

## 2025-06-26 PROCEDURE — 86803 HEPATITIS C AB TEST: CPT

## 2025-06-26 PROCEDURE — 82746 ASSAY OF FOLIC ACID SERUM: CPT

## 2025-06-26 PROCEDURE — 85025 COMPLETE CBC W/AUTO DIFF WBC: CPT

## 2025-06-26 PROCEDURE — 6370000000 HC RX 637 (ALT 250 FOR IP)

## 2025-06-26 PROCEDURE — 80061 LIPID PANEL: CPT

## 2025-06-26 PROCEDURE — 2500000003 HC RX 250 WO HCPCS: Performed by: FAMILY MEDICINE

## 2025-06-26 PROCEDURE — 83521 IG LIGHT CHAINS FREE EACH: CPT

## 2025-06-26 PROCEDURE — 80048 BASIC METABOLIC PNL TOTAL CA: CPT

## 2025-06-26 PROCEDURE — 1100000000 HC RM PRIVATE

## 2025-06-26 RX ORDER — SODIUM BICARBONATE 650 MG/1
650 TABLET ORAL 3 TIMES DAILY
Status: DISCONTINUED | OUTPATIENT
Start: 2025-06-26 | End: 2025-06-29

## 2025-06-26 RX ORDER — DEXTROSE MONOHYDRATE 100 MG/ML
INJECTION, SOLUTION INTRAVENOUS CONTINUOUS PRN
Status: DISCONTINUED | OUTPATIENT
Start: 2025-06-26 | End: 2025-07-01 | Stop reason: HOSPADM

## 2025-06-26 RX ORDER — IBUPROFEN 600 MG/1
1 TABLET ORAL PRN
Status: DISCONTINUED | OUTPATIENT
Start: 2025-06-26 | End: 2025-07-01 | Stop reason: HOSPADM

## 2025-06-26 RX ORDER — ATORVASTATIN CALCIUM 80 MG/1
80 TABLET, FILM COATED ORAL NIGHTLY
Status: DISCONTINUED | OUTPATIENT
Start: 2025-06-26 | End: 2025-07-01 | Stop reason: HOSPADM

## 2025-06-26 RX ORDER — INSULIN LISPRO 100 [IU]/ML
0-4 INJECTION, SOLUTION INTRAVENOUS; SUBCUTANEOUS
Status: DISCONTINUED | OUTPATIENT
Start: 2025-06-26 | End: 2025-07-01 | Stop reason: HOSPADM

## 2025-06-26 RX ADMIN — SODIUM CHLORIDE, PRESERVATIVE FREE 10 ML: 5 INJECTION INTRAVENOUS at 10:24

## 2025-06-26 RX ADMIN — SODIUM BICARBONATE 650 MG: 650 TABLET ORAL at 10:18

## 2025-06-26 RX ADMIN — FUROSEMIDE 40 MG: 10 INJECTION, SOLUTION INTRAMUSCULAR; INTRAVENOUS at 10:24

## 2025-06-26 RX ADMIN — SODIUM BICARBONATE 650 MG: 650 TABLET ORAL at 14:51

## 2025-06-26 RX ADMIN — SODIUM CHLORIDE 40 MG: 9 INJECTION INTRAMUSCULAR; INTRAVENOUS; SUBCUTANEOUS at 10:23

## 2025-06-26 RX ADMIN — ATORVASTATIN CALCIUM 80 MG: 80 TABLET, FILM COATED ORAL at 21:46

## 2025-06-26 RX ADMIN — SODIUM BICARBONATE 650 MG: 650 TABLET ORAL at 21:46

## 2025-06-26 RX ADMIN — CEFTRIAXONE SODIUM 2000 MG: 2 INJECTION, POWDER, FOR SOLUTION INTRAMUSCULAR; INTRAVENOUS at 21:49

## 2025-06-26 RX ADMIN — LISINOPRIL 2.5 MG: 5 TABLET ORAL at 10:19

## 2025-06-26 RX ADMIN — SODIUM CHLORIDE, PRESERVATIVE FREE 10 ML: 5 INJECTION INTRAVENOUS at 21:53

## 2025-06-26 NOTE — ACP (ADVANCE CARE PLANNING)
Advance Care Planning   Healthcare Decision Maker:    Primary Decision Maker: Nazario Alvarado - Child - [phone]    Primary Decision Maker: Princess Alvarado - Child - [phone]    Janusz Alvarado (18 y.o)   Micky Alvarado (16 y.o)     Today we documented Decision Maker(s) consistent with Legal Next of Kin hierarchy.     No LW/HCPOA on file. Pt is single and has 4 children who are LNOK.

## 2025-06-26 NOTE — CARE COORDINATION
Chart reviewed by CM for discharge planning. CM met with pt to complete assessment. Telephonic interpreting services provided by [employer]. ( Interpretor [employer]- ID#[id number].) Demographics verified. Pt uninsured and reported she is established with [facility] for primary care. Pt confirmed she is able to afford her medications and fills her prescriptions at [facility]. At baseline, pt is independent with completing ADL's and uses a quad cane prn. No other DME use. Pt is financially supported by her daughter. Pt with good family support and plans to return home when medically stable for discharge. No CM needs identified. CM will continue to follow pt plan of care.     06/26/25 1300   Service Assessment   Patient Orientation Alert and Oriented   Cognition Alert   History Provided By Patient;Child/Family   Primary Caregiver Self   Accompanied By/Relationship Daughter- Tiffany   Support Systems Children   Patient's Healthcare Decision Maker is: Legal Next of Kin   PCP Verified by CM Yes  ([facility]  Last appt 6/18/25)   Last Visit to PCP Within last 3 months   Prior Functional Level Independent in ADLs/IADLs   Current Functional Level Independent in ADLs/IADLs   Can patient return to prior living arrangement Yes   Ability to make needs known: Good   Family able to assist with home care needs: Yes   Would you like for me to discuss the discharge plan with any other family members/significant others, and if so, who? No  (Unless indicated or necessary.)   Financial Resources Financial Counseling   Community Resources None   CM/SW Referral Other (see comment)  (Discharge planning)   Social/Functional History   Lives With Daughter   Type of Home Trailer   Home Access Stairs to enter with rails   Entrance Stairs - Number of Steps 5   Home Equipment Cane - Quad   Receives Help From Family   Prior Level of Assist for ADLs Independent   Ambulation Assistance Independent   Prior Level of Assist for

## 2025-06-26 NOTE — ED NOTES
TRANSFER - OUT REPORT:    Verbal report given to nurse on Monserrat Alvarado  being transferred to [facility] for routine progression of patient care       Report consisted of patient's Situation, Background, Assessment and   Recommendations(SBAR).     Information from the following report(s) Nurse Handoff Report was reviewed with the receiving nurse.    [place] Fall Assessment:    Presents to emergency department  because of falls (Syncope, seizure, or loss of consciousness): No  Age > 70: No  Altered Mental Status, Intoxication with alcohol or substance confusion (Disorientation, impaired judgment, poor safety awaremess, or inability to follow instructions): No  Impaired Mobility: Ambulates or transfers with assistive devices or assistance; Unable to ambulate or transer.: No             Lines:   Peripheral IV 06/25/25 Left Antecubital (Active)   Site Assessment Clean, dry & intact 06/25/25 1534   Line Status Blood return noted;Flushed 06/25/25 1534   Phlebitis Assessment No symptoms 06/25/25 1534   Infiltration Assessment 0 06/25/25 1534   Alcohol Cap Used No 06/25/25 1534   Dressing Status New dressing applied 06/25/25 1534   Dressing Type Transparent 06/25/25 1534   Dressing Intervention New 06/25/25 1534        Opportunity for questions and clarification was provided.      Patient transported with:  Registered Nurse          Pricila Reese RN  06/25/25 2016

## 2025-06-27 LAB
ANION GAP SERPL CALC-SCNC: 10 MMOL/L (ref 7–16)
ANION GAP SERPL CALC-SCNC: 9 MMOL/L (ref 7–16)
BACTERIA SPEC CULT: ABNORMAL
BACTERIA SPEC CULT: ABNORMAL
BASOPHILS # BLD: 0.04 K/UL (ref 0–0.2)
BASOPHILS NFR BLD: 0.5 % (ref 0–2)
BUN SERPL-MCNC: 42 MG/DL (ref 6–23)
BUN SERPL-MCNC: 43 MG/DL (ref 6–23)
CALCIUM SERPL-MCNC: 7.1 MG/DL (ref 8.8–10.2)
CALCIUM SERPL-MCNC: 7.4 MG/DL (ref 8.8–10.2)
CHLORIDE SERPL-SCNC: 113 MMOL/L (ref 98–107)
CHLORIDE SERPL-SCNC: 114 MMOL/L (ref 98–107)
CO2 SERPL-SCNC: 14 MMOL/L (ref 20–29)
CO2 SERPL-SCNC: 15 MMOL/L (ref 20–29)
CREAT SERPL-MCNC: 2.06 MG/DL (ref 0.6–1.1)
CREAT SERPL-MCNC: 2.09 MG/DL (ref 0.6–1.1)
DIFFERENTIAL METHOD BLD: ABNORMAL
EOSINOPHIL # BLD: 0.25 K/UL (ref 0–0.8)
EOSINOPHIL NFR BLD: 3.4 % (ref 0.5–7.8)
ERYTHROCYTE [DISTWIDTH] IN BLOOD BY AUTOMATED COUNT: 14.6 % (ref 11.9–14.6)
GLUCOSE BLD STRIP.AUTO-MCNC: 101 MG/DL (ref 65–100)
GLUCOSE BLD STRIP.AUTO-MCNC: 105 MG/DL (ref 65–100)
GLUCOSE BLD STRIP.AUTO-MCNC: 117 MG/DL (ref 65–100)
GLUCOSE BLD STRIP.AUTO-MCNC: 137 MG/DL (ref 65–100)
GLUCOSE SERPL-MCNC: 113 MG/DL (ref 70–99)
GLUCOSE SERPL-MCNC: 114 MG/DL (ref 70–99)
HCT VFR BLD AUTO: 23.2 % (ref 35.8–46.3)
HCT VFR BLD AUTO: 24.7 % (ref 35.8–46.3)
HCV AB SERPL QL IA: NORMAL
HCV IGG SERPL QL IA: NON REACTIVE S/CO RATIO
HGB BLD-MCNC: 7.4 G/DL (ref 11.7–15.4)
HGB BLD-MCNC: 8 G/DL (ref 11.7–15.4)
IMM GRANULOCYTES # BLD AUTO: 0.03 K/UL (ref 0–0.5)
IMM GRANULOCYTES NFR BLD AUTO: 0.4 % (ref 0–5)
KAPPA LC FREE SER-MCNC: 164 MG/L (ref 2.4–20.7)
KAPPA LC FREE/LAMBDA FREE SER: 0.9 (ref 0.2–0.8)
LAMBDA LC FREE SERPL-MCNC: 182 MG/L (ref 4.2–27.7)
LYMPHOCYTES # BLD: 1.64 K/UL (ref 0.5–4.6)
LYMPHOCYTES NFR BLD: 22.3 % (ref 13–44)
MCH RBC QN AUTO: 29.8 PG (ref 26.1–32.9)
MCHC RBC AUTO-ENTMCNC: 31.9 G/DL (ref 31.4–35)
MCV RBC AUTO: 93.5 FL (ref 82–102)
MONOCYTES # BLD: 0.69 K/UL (ref 0.1–1.3)
MONOCYTES NFR BLD: 9.4 % (ref 4–12)
NEUTS SEG # BLD: 4.71 K/UL (ref 1.7–8.2)
NEUTS SEG NFR BLD: 64 % (ref 43–78)
NRBC # BLD: 0 K/UL (ref 0–0.2)
PLATELET # BLD AUTO: 280 K/UL (ref 150–450)
PMV BLD AUTO: 9.9 FL (ref 9.4–12.3)
POTASSIUM SERPL-SCNC: 3.9 MMOL/L (ref 3.5–5.1)
POTASSIUM SERPL-SCNC: 4.2 MMOL/L (ref 3.5–5.1)
RBC # BLD AUTO: 2.48 M/UL (ref 4.05–5.2)
SERVICE CMNT-IMP: ABNORMAL
SODIUM SERPL-SCNC: 137 MMOL/L (ref 136–145)
SODIUM SERPL-SCNC: 137 MMOL/L (ref 136–145)
WBC # BLD AUTO: 7.4 K/UL (ref 4.3–11.1)

## 2025-06-27 PROCEDURE — 82962 GLUCOSE BLOOD TEST: CPT

## 2025-06-27 PROCEDURE — 6370000000 HC RX 637 (ALT 250 FOR IP)

## 2025-06-27 PROCEDURE — 6360000002 HC RX W HCPCS: Performed by: FAMILY MEDICINE

## 2025-06-27 PROCEDURE — 6370000000 HC RX 637 (ALT 250 FOR IP): Performed by: FAMILY MEDICINE

## 2025-06-27 PROCEDURE — 85025 COMPLETE CBC W/AUTO DIFF WBC: CPT

## 2025-06-27 PROCEDURE — 85014 HEMATOCRIT: CPT

## 2025-06-27 PROCEDURE — 2500000003 HC RX 250 WO HCPCS: Performed by: FAMILY MEDICINE

## 2025-06-27 PROCEDURE — 1100000000 HC RM PRIVATE

## 2025-06-27 PROCEDURE — 84156 ASSAY OF PROTEIN URINE: CPT

## 2025-06-27 PROCEDURE — 82570 ASSAY OF URINE CREATININE: CPT

## 2025-06-27 PROCEDURE — 2580000003 HC RX 258: Performed by: FAMILY MEDICINE

## 2025-06-27 PROCEDURE — 36415 COLL VENOUS BLD VENIPUNCTURE: CPT

## 2025-06-27 PROCEDURE — 85018 HEMOGLOBIN: CPT

## 2025-06-27 PROCEDURE — 80048 BASIC METABOLIC PNL TOTAL CA: CPT

## 2025-06-27 RX ORDER — LANOLIN ALCOHOL/MO/W.PET/CERES
1000 CREAM (GRAM) TOPICAL DAILY
Status: DISCONTINUED | OUTPATIENT
Start: 2025-06-27 | End: 2025-07-01 | Stop reason: HOSPADM

## 2025-06-27 RX ADMIN — FUROSEMIDE 40 MG: 10 INJECTION, SOLUTION INTRAMUSCULAR; INTRAVENOUS at 08:43

## 2025-06-27 RX ADMIN — SODIUM CHLORIDE, PRESERVATIVE FREE 10 ML: 5 INJECTION INTRAVENOUS at 21:32

## 2025-06-27 RX ADMIN — LISINOPRIL 2.5 MG: 5 TABLET ORAL at 08:43

## 2025-06-27 RX ADMIN — CEFTRIAXONE SODIUM 2000 MG: 2 INJECTION, POWDER, FOR SOLUTION INTRAMUSCULAR; INTRAVENOUS at 21:25

## 2025-06-27 RX ADMIN — SODIUM BICARBONATE 650 MG: 650 TABLET ORAL at 14:14

## 2025-06-27 RX ADMIN — SODIUM BICARBONATE 650 MG: 650 TABLET ORAL at 21:27

## 2025-06-27 RX ADMIN — Medication 3 MG: at 21:28

## 2025-06-27 RX ADMIN — CYANOCOBALAMIN TAB 1000 MCG 1000 MCG: 1000 TAB at 08:43

## 2025-06-27 RX ADMIN — SODIUM CHLORIDE, PRESERVATIVE FREE 10 ML: 5 INJECTION INTRAVENOUS at 08:39

## 2025-06-27 RX ADMIN — ATORVASTATIN CALCIUM 80 MG: 80 TABLET, FILM COATED ORAL at 21:27

## 2025-06-27 RX ADMIN — SODIUM CHLORIDE 40 MG: 9 INJECTION INTRAMUSCULAR; INTRAVENOUS; SUBCUTANEOUS at 08:43

## 2025-06-27 RX ADMIN — SODIUM BICARBONATE 650 MG: 650 TABLET ORAL at 08:43

## 2025-06-27 ASSESSMENT — PAIN SCALES - GENERAL: PAINLEVEL_OUTOF10: 0

## 2025-06-27 NOTE — FLOWSHEET NOTE
06/27/25 1429   Vital Signs   BP (!) 160/92   MAP (Calculated) 115   MAP (mmHg) 113   BP Location Right upper arm   Patient Position Supine     Ramesh Dye MD notified. No new orders at this time

## 2025-06-28 PROBLEM — N39.0 E. COLI URINARY TRACT INFECTION: Status: ACTIVE | Noted: 2025-06-28

## 2025-06-28 PROBLEM — B96.20 E. COLI URINARY TRACT INFECTION: Status: ACTIVE | Noted: 2025-06-28

## 2025-06-28 LAB
ANA SER QL: NEGATIVE
ANION GAP SERPL CALC-SCNC: 9 MMOL/L (ref 7–16)
BASOPHILS # BLD: 0.04 K/UL (ref 0–0.2)
BASOPHILS NFR BLD: 0.6 % (ref 0–2)
BUN SERPL-MCNC: 41 MG/DL (ref 6–23)
C3 SERPL-MCNC: 129 MG/DL (ref 82–167)
C4 SERPL-MCNC: 30 MG/DL (ref 12–38)
CALCIUM SERPL-MCNC: 7.4 MG/DL (ref 8.8–10.2)
CHLORIDE SERPL-SCNC: 112 MMOL/L (ref 98–107)
CO2 SERPL-SCNC: 16 MMOL/L (ref 20–29)
COLLECT DURATION TIME UR: 24 HR
CREAT 24H UR-MRATE: 629 MG/24HR (ref 740–1570)
CREAT SERPL-MCNC: 2.14 MG/DL (ref 0.6–1.1)
DIFFERENTIAL METHOD BLD: ABNORMAL
EOSINOPHIL # BLD: 0.18 K/UL (ref 0–0.8)
EOSINOPHIL NFR BLD: 2.8 % (ref 0.5–7.8)
ERYTHROCYTE [DISTWIDTH] IN BLOOD BY AUTOMATED COUNT: 14.6 % (ref 11.9–14.6)
GLUCOSE BLD STRIP.AUTO-MCNC: 110 MG/DL (ref 65–100)
GLUCOSE BLD STRIP.AUTO-MCNC: 119 MG/DL (ref 65–100)
GLUCOSE BLD STRIP.AUTO-MCNC: 123 MG/DL (ref 65–100)
GLUCOSE BLD STRIP.AUTO-MCNC: 124 MG/DL (ref 65–100)
GLUCOSE SERPL-MCNC: 116 MG/DL (ref 70–99)
HCT VFR BLD AUTO: 24.5 % (ref 35.8–46.3)
HGB BLD-MCNC: 7.9 G/DL (ref 11.7–15.4)
IMM GRANULOCYTES # BLD AUTO: 0.02 K/UL (ref 0–0.5)
IMM GRANULOCYTES NFR BLD AUTO: 0.3 % (ref 0–5)
LYMPHOCYTES # BLD: 1.92 K/UL (ref 0.5–4.6)
LYMPHOCYTES NFR BLD: 30.1 % (ref 13–44)
MCH RBC QN AUTO: 30.6 PG (ref 26.1–32.9)
MCHC RBC AUTO-ENTMCNC: 32.2 G/DL (ref 31.4–35)
MCV RBC AUTO: 95 FL (ref 82–102)
MONOCYTES # BLD: 0.53 K/UL (ref 0.1–1.3)
MONOCYTES NFR BLD: 8.3 % (ref 4–12)
NEUTS SEG # BLD: 3.68 K/UL (ref 1.7–8.2)
NEUTS SEG NFR BLD: 57.9 % (ref 43–78)
NRBC # BLD: 0 K/UL (ref 0–0.2)
PLATELET # BLD AUTO: 316 K/UL (ref 150–450)
PMV BLD AUTO: 10.2 FL (ref 9.4–12.3)
POTASSIUM SERPL-SCNC: 3.8 MMOL/L (ref 3.5–5.1)
PROT 24H UR-MRATE: ABNORMAL MG/24HR (ref 0–150)
PROT/CREAT 24H UR-RTO: 18.44 RATIO
RBC # BLD AUTO: 2.58 M/UL (ref 4.05–5.2)
SERVICE CMNT-IMP: ABNORMAL
SODIUM SERPL-SCNC: 137 MMOL/L (ref 136–145)
SPECIMEN VOL ?TM UR: 2075 ML
WBC # BLD AUTO: 6.4 K/UL (ref 4.3–11.1)

## 2025-06-28 PROCEDURE — 6370000000 HC RX 637 (ALT 250 FOR IP): Performed by: FAMILY MEDICINE

## 2025-06-28 PROCEDURE — 6370000000 HC RX 637 (ALT 250 FOR IP)

## 2025-06-28 PROCEDURE — 83516 IMMUNOASSAY NONANTIBODY: CPT

## 2025-06-28 PROCEDURE — 80048 BASIC METABOLIC PNL TOTAL CA: CPT

## 2025-06-28 PROCEDURE — 85025 COMPLETE CBC W/AUTO DIFF WBC: CPT

## 2025-06-28 PROCEDURE — 82962 GLUCOSE BLOOD TEST: CPT

## 2025-06-28 PROCEDURE — 2500000003 HC RX 250 WO HCPCS: Performed by: FAMILY MEDICINE

## 2025-06-28 PROCEDURE — 1100000000 HC RM PRIVATE

## 2025-06-28 PROCEDURE — 2580000003 HC RX 258: Performed by: FAMILY MEDICINE

## 2025-06-28 PROCEDURE — 6360000002 HC RX W HCPCS

## 2025-06-28 PROCEDURE — 6360000002 HC RX W HCPCS: Performed by: FAMILY MEDICINE

## 2025-06-28 PROCEDURE — 36415 COLL VENOUS BLD VENIPUNCTURE: CPT

## 2025-06-28 RX ORDER — FUROSEMIDE 10 MG/ML
40 INJECTION INTRAMUSCULAR; INTRAVENOUS 2 TIMES DAILY
Status: DISCONTINUED | OUTPATIENT
Start: 2025-06-28 | End: 2025-07-01 | Stop reason: HOSPADM

## 2025-06-28 RX ADMIN — LISINOPRIL 2.5 MG: 5 TABLET ORAL at 07:47

## 2025-06-28 RX ADMIN — SODIUM BICARBONATE 650 MG: 650 TABLET ORAL at 13:19

## 2025-06-28 RX ADMIN — CEFTRIAXONE SODIUM 2000 MG: 2 INJECTION, POWDER, FOR SOLUTION INTRAMUSCULAR; INTRAVENOUS at 20:52

## 2025-06-28 RX ADMIN — FUROSEMIDE 40 MG: 10 INJECTION, SOLUTION INTRAMUSCULAR; INTRAVENOUS at 17:17

## 2025-06-28 RX ADMIN — SODIUM BICARBONATE 650 MG: 650 TABLET ORAL at 07:47

## 2025-06-28 RX ADMIN — CYANOCOBALAMIN TAB 1000 MCG 1000 MCG: 1000 TAB at 07:47

## 2025-06-28 RX ADMIN — SODIUM BICARBONATE 650 MG: 650 TABLET ORAL at 20:46

## 2025-06-28 RX ADMIN — FUROSEMIDE 40 MG: 10 INJECTION, SOLUTION INTRAMUSCULAR; INTRAVENOUS at 07:45

## 2025-06-28 RX ADMIN — ATORVASTATIN CALCIUM 80 MG: 80 TABLET, FILM COATED ORAL at 20:46

## 2025-06-28 RX ADMIN — Medication 3 MG: at 20:46

## 2025-06-28 RX ADMIN — SODIUM CHLORIDE, PRESERVATIVE FREE 10 ML: 5 INJECTION INTRAVENOUS at 20:55

## 2025-06-28 RX ADMIN — SODIUM CHLORIDE, PRESERVATIVE FREE 10 ML: 5 INJECTION INTRAVENOUS at 07:45

## 2025-06-28 RX ADMIN — SODIUM CHLORIDE 40 MG: 9 INJECTION INTRAMUSCULAR; INTRAVENOUS; SUBCUTANEOUS at 07:45

## 2025-06-28 ASSESSMENT — PAIN SCALES - GENERAL
PAINLEVEL_OUTOF10: 0
PAINLEVEL_OUTOF10: 0

## 2025-06-28 NOTE — PLAN OF CARE
Problem: Chronic Conditions and Co-morbidities  Goal: Patient's chronic conditions and co-morbidity symptoms are monitored and maintained or improved  6/28/2025 1151 by Sarika Dao RN  Outcome: Progressing  6/28/2025 0438 by Sarika Bolden RN  Outcome: Progressing     Problem: Pain  Goal: Verbalizes/displays adequate comfort level or baseline comfort level  6/28/2025 1151 by Sarika Dao RN  Outcome: Progressing  6/28/2025 0438 by Sarika Bolden RN  Outcome: Progressing     Problem: Safety - Adult  Goal: Free from fall injury  6/28/2025 1151 by Sarika Dao RN  Outcome: Progressing  6/28/2025 0438 by Sarika Bolden RN  Outcome: Progressing     Problem: Respiratory - Adult  Goal: Achieves optimal ventilation and oxygenation  Outcome: Progressing     Problem: Cardiovascular - Adult  Goal: Maintains optimal cardiac output and hemodynamic stability  Outcome: Progressing     Problem: Skin/Tissue Integrity - Adult  Goal: Skin integrity remains intact  Outcome: Progressing     Problem: Musculoskeletal - Adult  Goal: Return mobility to safest level of function  Outcome: Progressing  Goal: Return ADL status to a safe level of function  Outcome: Progressing     Problem: Gastrointestinal - Adult  Goal: Maintains or returns to baseline bowel function  Outcome: Progressing     Problem: Metabolic/Fluid and Electrolytes - Adult  Goal: Electrolytes maintained within normal limits  Outcome: Progressing  Goal: Hemodynamic stability and optimal renal function maintained  Outcome: Progressing  Goal: Glucose maintained within prescribed range  Outcome: Progressing     Problem: Hematologic - Adult  Goal: Maintains hematologic stability  Outcome: Progressing

## 2025-06-29 ENCOUNTER — APPOINTMENT (OUTPATIENT)
Dept: ULTRASOUND IMAGING | Age: 45
DRG: 683 | End: 2025-06-29
Payer: MEDICAID

## 2025-06-29 LAB
ALBUMIN SERPL-MCNC: 1.2 G/DL (ref 3.5–5)
ALBUMIN/GLOB SERPL: 0.3 (ref 1–1.9)
ALP SERPL-CCNC: 100 U/L (ref 35–104)
ALT SERPL-CCNC: 15 U/L (ref 8–45)
ANION GAP SERPL CALC-SCNC: 10 MMOL/L (ref 7–16)
AST SERPL-CCNC: 22 U/L (ref 15–37)
BASOPHILS # BLD: 0.05 K/UL (ref 0–0.2)
BASOPHILS NFR BLD: 0.7 % (ref 0–2)
BILIRUB DIRECT SERPL-MCNC: <0.1 MG/DL (ref 0–0.3)
BILIRUB SERPL-MCNC: <0.2 MG/DL (ref 0–1.2)
BUN SERPL-MCNC: 44 MG/DL (ref 6–23)
CALCIUM SERPL-MCNC: 7.4 MG/DL (ref 8.8–10.2)
CHLORIDE SERPL-SCNC: 112 MMOL/L (ref 98–107)
CO2 SERPL-SCNC: 15 MMOL/L (ref 20–29)
CREAT SERPL-MCNC: 2.22 MG/DL (ref 0.6–1.1)
DIFFERENTIAL METHOD BLD: ABNORMAL
EOSINOPHIL # BLD: 0.19 K/UL (ref 0–0.8)
EOSINOPHIL NFR BLD: 2.6 % (ref 0.5–7.8)
ERYTHROCYTE [DISTWIDTH] IN BLOOD BY AUTOMATED COUNT: 14 % (ref 11.9–14.6)
GLOBULIN SER CALC-MCNC: 3.8 G/DL (ref 2.3–3.5)
GLUCOSE BLD STRIP.AUTO-MCNC: 109 MG/DL (ref 65–100)
GLUCOSE BLD STRIP.AUTO-MCNC: 110 MG/DL (ref 65–100)
GLUCOSE BLD STRIP.AUTO-MCNC: 112 MG/DL (ref 65–100)
GLUCOSE BLD STRIP.AUTO-MCNC: 116 MG/DL (ref 65–100)
GLUCOSE SERPL-MCNC: 110 MG/DL (ref 70–99)
HCT VFR BLD AUTO: 21.9 % (ref 35.8–46.3)
HGB BLD-MCNC: 7.1 G/DL (ref 11.7–15.4)
IMM GRANULOCYTES # BLD AUTO: 0.02 K/UL (ref 0–0.5)
IMM GRANULOCYTES NFR BLD AUTO: 0.3 % (ref 0–5)
LYMPHOCYTES # BLD: 2.01 K/UL (ref 0.5–4.6)
LYMPHOCYTES NFR BLD: 28 % (ref 13–44)
MCH RBC QN AUTO: 29.6 PG (ref 26.1–32.9)
MCHC RBC AUTO-ENTMCNC: 32.4 G/DL (ref 31.4–35)
MCV RBC AUTO: 91.3 FL (ref 82–102)
MONOCYTES # BLD: 0.65 K/UL (ref 0.1–1.3)
MONOCYTES NFR BLD: 9.1 % (ref 4–12)
NEUTS SEG # BLD: 4.26 K/UL (ref 1.7–8.2)
NEUTS SEG NFR BLD: 59.3 % (ref 43–78)
NRBC # BLD: 0 K/UL (ref 0–0.2)
PLATELET # BLD AUTO: 264 K/UL (ref 150–450)
PMV BLD AUTO: 10 FL (ref 9.4–12.3)
POTASSIUM SERPL-SCNC: 3.6 MMOL/L (ref 3.5–5.1)
PROT SERPL-MCNC: 5 G/DL (ref 6.3–8.2)
RBC # BLD AUTO: 2.4 M/UL (ref 4.05–5.2)
SERVICE CMNT-IMP: ABNORMAL
SODIUM SERPL-SCNC: 137 MMOL/L (ref 136–145)
WBC # BLD AUTO: 7.2 K/UL (ref 4.3–11.1)

## 2025-06-29 PROCEDURE — 2500000003 HC RX 250 WO HCPCS: Performed by: FAMILY MEDICINE

## 2025-06-29 PROCEDURE — 6370000000 HC RX 637 (ALT 250 FOR IP)

## 2025-06-29 PROCEDURE — 6360000002 HC RX W HCPCS: Performed by: FAMILY MEDICINE

## 2025-06-29 PROCEDURE — 80048 BASIC METABOLIC PNL TOTAL CA: CPT

## 2025-06-29 PROCEDURE — 36415 COLL VENOUS BLD VENIPUNCTURE: CPT

## 2025-06-29 PROCEDURE — 93971 EXTREMITY STUDY: CPT | Performed by: RADIOLOGY

## 2025-06-29 PROCEDURE — 80076 HEPATIC FUNCTION PANEL: CPT

## 2025-06-29 PROCEDURE — 82962 GLUCOSE BLOOD TEST: CPT

## 2025-06-29 PROCEDURE — 1100000000 HC RM PRIVATE

## 2025-06-29 PROCEDURE — 93971 EXTREMITY STUDY: CPT

## 2025-06-29 PROCEDURE — 6360000002 HC RX W HCPCS

## 2025-06-29 PROCEDURE — 2500000003 HC RX 250 WO HCPCS

## 2025-06-29 PROCEDURE — 85025 COMPLETE CBC W/AUTO DIFF WBC: CPT

## 2025-06-29 RX ORDER — SODIUM BICARBONATE 650 MG/1
650 TABLET ORAL 4 TIMES DAILY
Status: DISCONTINUED | OUTPATIENT
Start: 2025-06-29 | End: 2025-07-01 | Stop reason: HOSPADM

## 2025-06-29 RX ORDER — PANTOPRAZOLE SODIUM 40 MG/1
40 TABLET, DELAYED RELEASE ORAL
Status: DISCONTINUED | OUTPATIENT
Start: 2025-06-30 | End: 2025-07-01 | Stop reason: HOSPADM

## 2025-06-29 RX ORDER — LISINOPRIL 5 MG/1
5 TABLET ORAL DAILY
Status: DISCONTINUED | OUTPATIENT
Start: 2025-06-29 | End: 2025-07-01 | Stop reason: HOSPADM

## 2025-06-29 RX ADMIN — CYANOCOBALAMIN TAB 1000 MCG 1000 MCG: 1000 TAB at 09:34

## 2025-06-29 RX ADMIN — SODIUM CHLORIDE, PRESERVATIVE FREE 10 ML: 5 INJECTION INTRAVENOUS at 09:33

## 2025-06-29 RX ADMIN — FUROSEMIDE 40 MG: 10 INJECTION, SOLUTION INTRAMUSCULAR; INTRAVENOUS at 09:33

## 2025-06-29 RX ADMIN — SODIUM BICARBONATE 650 MG: 650 TABLET ORAL at 09:34

## 2025-06-29 RX ADMIN — SODIUM BICARBONATE 650 MG: 650 TABLET ORAL at 20:33

## 2025-06-29 RX ADMIN — LISINOPRIL 5 MG: 5 TABLET ORAL at 09:34

## 2025-06-29 RX ADMIN — SODIUM BICARBONATE 650 MG: 650 TABLET ORAL at 13:09

## 2025-06-29 RX ADMIN — CEFTRIAXONE 1000 MG: 1 INJECTION, POWDER, FOR SOLUTION INTRAMUSCULAR; INTRAVENOUS at 20:34

## 2025-06-29 RX ADMIN — ATORVASTATIN CALCIUM 80 MG: 80 TABLET, FILM COATED ORAL at 20:33

## 2025-06-29 RX ADMIN — SODIUM CHLORIDE, PRESERVATIVE FREE 10 ML: 5 INJECTION INTRAVENOUS at 20:34

## 2025-06-29 RX ADMIN — SODIUM CHLORIDE 40 MG: 9 INJECTION INTRAMUSCULAR; INTRAVENOUS; SUBCUTANEOUS at 09:33

## 2025-06-29 RX ADMIN — SODIUM BICARBONATE 650 MG: 650 TABLET ORAL at 17:22

## 2025-06-29 RX ADMIN — FUROSEMIDE 40 MG: 10 INJECTION, SOLUTION INTRAMUSCULAR; INTRAVENOUS at 17:22

## 2025-06-29 NOTE — PLAN OF CARE
Problem: Chronic Conditions and Co-morbidities  Goal: Patient's chronic conditions and co-morbidity symptoms are monitored and maintained or improved  6/29/2025 0345 by Sarika Bolden RN  Outcome: Progressing     Problem: Pain  Goal: Verbalizes/displays adequate comfort level or baseline comfort level  6/29/2025 1148 by Jimenez Johnson RN  Outcome: Progressing  6/29/2025 0345 by Sarika Bolden RN  Outcome: Progressing     Problem: Safety - Adult  Goal: Free from fall injury  6/29/2025 1148 by Jimenez Johnson RN  Outcome: Progressing  6/29/2025 0345 by Sarika Bolden RN  Outcome: Progressing     Problem: Respiratory - Adult  Goal: Achieves optimal ventilation and oxygenation  6/29/2025 0345 by Sarika Bolden RN  Outcome: Progressing     Problem: Cardiovascular - Adult  Goal: Maintains optimal cardiac output and hemodynamic stability  6/29/2025 0345 by Sarika Bolden RN  Outcome: Progressing     Problem: Skin/Tissue Integrity - Adult  Goal: Skin integrity remains intact  6/29/2025 0345 by Sarika Bolden RN  Outcome: Progressing     Problem: Musculoskeletal - Adult  Goal: Return mobility to safest level of function  6/29/2025 0345 by Sarika Bolden RN  Outcome: Progressing  Goal: Return ADL status to a safe level of function  6/29/2025 0345 by Sarika Bolden RN  Outcome: Progressing     Problem: Gastrointestinal - Adult  Goal: Maintains or returns to baseline bowel function  6/29/2025 0345 by Sarika Bolden RN  Outcome: Progressing     Problem: Metabolic/Fluid and Electrolytes - Adult  Goal: Electrolytes maintained within normal limits  6/29/2025 0345 by Sarika Bolden RN  Outcome: Progressing  Goal: Hemodynamic stability and optimal renal function maintained  6/29/2025 0345 by Sarika Bolden RN  Outcome: Progressing  Goal: Glucose maintained within prescribed range  6/29/2025 0345 by Sarika Bolden RN  Outcome: Progressing     Problem: Hematologic - Adult  Goal: Maintains

## 2025-06-30 ENCOUNTER — APPOINTMENT (OUTPATIENT)
Dept: ULTRASOUND IMAGING | Age: 45
DRG: 683 | End: 2025-06-30
Payer: MEDICAID

## 2025-06-30 PROBLEM — N04.9 NEPHROTIC SYNDROME: Status: ACTIVE | Noted: 2025-06-30

## 2025-06-30 LAB
ALBUMIN SERPL ELPH-MCNC: 1.7 G/DL (ref 2.9–4.4)
ALBUMIN SERPL-MCNC: 1.2 G/DL (ref 3.5–5)
ALBUMIN/GLOB SERPL: 0.3 (ref 1–1.9)
ALBUMIN/GLOB SERPL: 0.5 (ref 0.7–1.7)
ALP SERPL-CCNC: 82 U/L (ref 35–104)
ALPHA1 GLOB SERPL ELPH-MCNC: 0.2 G/DL (ref 0–0.4)
ALPHA2 GLOB SERPL ELPH-MCNC: 1.2 G/DL (ref 0.4–1)
ALT SERPL-CCNC: 14 U/L (ref 8–45)
ANION GAP SERPL CALC-SCNC: 9 MMOL/L (ref 7–16)
AST SERPL-CCNC: 20 U/L (ref 15–37)
B-GLOBULIN SERPL ELPH-MCNC: 0.7 G/DL (ref 0.7–1.3)
BASOPHILS # BLD: 0.03 K/UL (ref 0–0.2)
BASOPHILS NFR BLD: 0.5 % (ref 0–2)
BILIRUB SERPL-MCNC: <0.2 MG/DL (ref 0–1.2)
BUN SERPL-MCNC: 44 MG/DL (ref 6–23)
C-ANCA TITR SER IF: NORMAL TITER
CALCIUM SERPL-MCNC: 7.5 MG/DL (ref 8.8–10.2)
CHLORIDE SERPL-SCNC: 112 MMOL/L (ref 98–107)
CO2 SERPL-SCNC: 18 MMOL/L (ref 20–29)
CREAT SERPL-MCNC: 2.14 MG/DL (ref 0.6–1.1)
DIFFERENTIAL METHOD BLD: ABNORMAL
EOSINOPHIL # BLD: 0.19 K/UL (ref 0–0.8)
EOSINOPHIL NFR BLD: 3 % (ref 0.5–7.8)
ERYTHROCYTE [DISTWIDTH] IN BLOOD BY AUTOMATED COUNT: 14.2 % (ref 11.9–14.6)
GAMMA GLOB SERPL ELPH-MCNC: 1.1 G/DL (ref 0.4–1.8)
GLOBULIN SER CALC-MCNC: 3.3 G/DL (ref 2.2–3.9)
GLOBULIN SER CALC-MCNC: 3.6 G/DL (ref 2.3–3.5)
GLUCOSE BLD STRIP.AUTO-MCNC: 104 MG/DL (ref 65–100)
GLUCOSE BLD STRIP.AUTO-MCNC: 110 MG/DL (ref 65–100)
GLUCOSE BLD STRIP.AUTO-MCNC: 127 MG/DL (ref 65–100)
GLUCOSE BLD STRIP.AUTO-MCNC: 130 MG/DL (ref 65–100)
GLUCOSE SERPL-MCNC: 102 MG/DL (ref 70–99)
HCT VFR BLD AUTO: 21.1 % (ref 35.8–46.3)
HGB BLD-MCNC: 7 G/DL (ref 11.7–15.4)
IMM GRANULOCYTES # BLD AUTO: 0.02 K/UL (ref 0–0.5)
IMM GRANULOCYTES NFR BLD AUTO: 0.3 % (ref 0–5)
LYMPHOCYTES # BLD: 1.94 K/UL (ref 0.5–4.6)
LYMPHOCYTES NFR BLD: 31 % (ref 13–44)
M PROTEIN SERPL ELPH-MCNC: ABNORMAL G/DL
MAGNESIUM SERPL-MCNC: 2.5 MG/DL (ref 1.8–2.4)
MCH RBC QN AUTO: 30.2 PG (ref 26.1–32.9)
MCHC RBC AUTO-ENTMCNC: 33.2 G/DL (ref 31.4–35)
MCV RBC AUTO: 90.9 FL (ref 82–102)
MONOCYTES # BLD: 0.6 K/UL (ref 0.1–1.3)
MONOCYTES NFR BLD: 9.6 % (ref 4–12)
MYELOPEROXIDASE AB SER IA-ACNC: <0.2 UNITS (ref 0–0.9)
NEUTS SEG # BLD: 3.48 K/UL (ref 1.7–8.2)
NEUTS SEG NFR BLD: 55.6 % (ref 43–78)
NRBC # BLD: 0 K/UL (ref 0–0.2)
P-ANCA ATYPICAL TITR SER IF: NORMAL TITER
P-ANCA TITR SER IF: NORMAL TITER
PLATELET # BLD AUTO: 255 K/UL (ref 150–450)
PMV BLD AUTO: 9.8 FL (ref 9.4–12.3)
POTASSIUM SERPL-SCNC: 3.2 MMOL/L (ref 3.5–5.1)
PROT SERPL-MCNC: 4.7 G/DL (ref 6.3–8.2)
PROT SERPL-MCNC: 5 G/DL (ref 6–8.5)
PROTEINASE3 AB SER IA-ACNC: <0.2 UNITS (ref 0–0.9)
RBC # BLD AUTO: 2.32 M/UL (ref 4.05–5.2)
SERVICE CMNT-IMP: ABNORMAL
SODIUM SERPL-SCNC: 138 MMOL/L (ref 136–145)
WBC # BLD AUTO: 6.3 K/UL (ref 4.3–11.1)

## 2025-06-30 PROCEDURE — 93971 EXTREMITY STUDY: CPT | Performed by: RADIOLOGY

## 2025-06-30 PROCEDURE — 93970 EXTREMITY STUDY: CPT | Performed by: RADIOLOGY

## 2025-06-30 PROCEDURE — 82962 GLUCOSE BLOOD TEST: CPT

## 2025-06-30 PROCEDURE — 76937 US GUIDE VASCULAR ACCESS: CPT

## 2025-06-30 PROCEDURE — 2500000003 HC RX 250 WO HCPCS: Performed by: FAMILY MEDICINE

## 2025-06-30 PROCEDURE — 36415 COLL VENOUS BLD VENIPUNCTURE: CPT

## 2025-06-30 PROCEDURE — 93970 EXTREMITY STUDY: CPT

## 2025-06-30 PROCEDURE — 85025 COMPLETE CBC W/AUTO DIFF WBC: CPT

## 2025-06-30 PROCEDURE — 6370000000 HC RX 637 (ALT 250 FOR IP)

## 2025-06-30 PROCEDURE — 6360000002 HC RX W HCPCS

## 2025-06-30 PROCEDURE — 93971 EXTREMITY STUDY: CPT

## 2025-06-30 PROCEDURE — 83735 ASSAY OF MAGNESIUM: CPT

## 2025-06-30 PROCEDURE — 2580000003 HC RX 258

## 2025-06-30 PROCEDURE — 80053 COMPREHEN METABOLIC PANEL: CPT

## 2025-06-30 PROCEDURE — 1100000000 HC RM PRIVATE

## 2025-06-30 RX ORDER — POTASSIUM CHLORIDE 1500 MG/1
40 TABLET, EXTENDED RELEASE ORAL ONCE
Status: COMPLETED | OUTPATIENT
Start: 2025-06-30 | End: 2025-06-30

## 2025-06-30 RX ADMIN — ATORVASTATIN CALCIUM 80 MG: 80 TABLET, FILM COATED ORAL at 19:16

## 2025-06-30 RX ADMIN — POTASSIUM CHLORIDE 40 MEQ: 1500 TABLET, EXTENDED RELEASE ORAL at 09:00

## 2025-06-30 RX ADMIN — FUROSEMIDE 40 MG: 10 INJECTION, SOLUTION INTRAMUSCULAR; INTRAVENOUS at 16:50

## 2025-06-30 RX ADMIN — CYANOCOBALAMIN TAB 1000 MCG 1000 MCG: 1000 TAB at 09:01

## 2025-06-30 RX ADMIN — SODIUM CHLORIDE, PRESERVATIVE FREE 10 ML: 5 INJECTION INTRAVENOUS at 19:16

## 2025-06-30 RX ADMIN — SODIUM BICARBONATE 650 MG: 650 TABLET ORAL at 16:50

## 2025-06-30 RX ADMIN — SODIUM BICARBONATE 650 MG: 650 TABLET ORAL at 19:15

## 2025-06-30 RX ADMIN — LISINOPRIL 5 MG: 5 TABLET ORAL at 09:01

## 2025-06-30 RX ADMIN — PANTOPRAZOLE SODIUM 40 MG: 40 TABLET, DELAYED RELEASE ORAL at 06:04

## 2025-06-30 RX ADMIN — SODIUM CHLORIDE 125 MG: 9 INJECTION, SOLUTION INTRAVENOUS at 16:50

## 2025-06-30 RX ADMIN — SODIUM BICARBONATE 650 MG: 650 TABLET ORAL at 09:01

## 2025-06-30 ASSESSMENT — PAIN SCALES - GENERAL: PAINLEVEL_OUTOF10: 0

## 2025-06-30 NOTE — CARE COORDINATION
Chart reviewed by CM for continued stay. Pt remains hospitalized, pending further workup for nephrotic syndrome. Pt discussed during IDR. Plan for kidney bx with IR 7/1. No CM needs identified at this time. CM will continue to follow pt plan of care. Please consult CM for any needs that may arise.

## 2025-06-30 NOTE — PLAN OF CARE
Problem: Chronic Conditions and Co-morbidities  Goal: Patient's chronic conditions and co-morbidity symptoms are monitored and maintained or improved  Outcome: Progressing     Problem: Pain  Goal: Verbalizes/displays adequate comfort level or baseline comfort level  6/29/2025 2304 by Justa Avendaño RN  Outcome: Progressing  6/29/2025 1148 by Jimenez Johnson RN  Outcome: Progressing     Problem: Safety - Adult  Goal: Free from fall injury  6/29/2025 2304 by Justa Avendaño RN  Outcome: Progressing  6/29/2025 1148 by Jimenez Johnson RN  Outcome: Progressing     Problem: Skin/Tissue Integrity - Adult  Goal: Skin integrity remains intact  Outcome: Progressing     Problem: Musculoskeletal - Adult  Goal: Return mobility to safest level of function  Outcome: Progressing     Problem: Gastrointestinal - Adult  Goal: Maintains or returns to baseline bowel function  Outcome: Progressing

## 2025-06-30 NOTE — CONSULTS
Consult Note            Date:6/26/2025        Patient Name:Monserrat Alvarado     YOB: 1980     Age:45 y.o.    Inpatient consult to GI  Consult performed by: Blake Bullock PA  Consult ordered by: Pricila Townsend DO          Chief Complaint     Chief Complaint   Patient presents with    Abdominal Pain    Abnormal Lab        History Obtained From   patient    History of Present Illness   Patient is a 45 year old female, with a PMHx of HTN, DM, who was admitted for weakness, fatigue. Found to have a Hgb of 6.5 from 9.4 in 2024; now up to 8.4 after a unit of blood. Also with CARLOS EDUARDO, abdominal ascites (normal liver on CT) and nephrology consult pending. GI consulted to consider possible endoscopy given anemia in setting of elevated BUN (with CARLOS EDUARDO as well). Daughter used at bedside to translate as internet services are down. Patient denies any overt blood in stool (is on menstrual cycle), nausea, vomiting, weight loss, YANICK, GERD symptoms. Did have some abdominal discomfort initially, but improved. No hx of GIB. No NSAIDS or blood thinners. No prior endoscopy. No FHx of GI malignancy. No ETOH/tobacco use.     Labs: Hgb 8.4 (6.5 on admission; given 1 unit; 9.4 in 2024), MCV 94, BUN 42, Cr 2.01, Iron 28, Plt 299    Imaging: CT AP: IMPRESSION:     Air within the urinary bladder, possibly iatrogenic. Urinalysis may be obtained  for further evaluation.     Diffuse abdominal and pelvic anasarca.     Mild abdominopelvic ascites.     Myocardial hyperattenuation concerning for anemia.     Etiology of abdominal pain is not well elucidated.    Medications: Rocephin, IV PPI daily.     Past Medical History   No past medical history on file.     Past Surgical History   No past surgical history on file.     Medications     Prior to Admission medications    Medication Sig Start Date End Date Taking? Authorizing Provider   empagliflozin (JARDIANCE) 25 MG tablet Take 1 tablet by mouth daily   Yes Provider, Historical, 
See my previous consult note  Thx   
Session ID: [id number]  Session Duration: 20 minutes  Language: Thai   ID: #[id number]   Name: Ty
Session ID: [id number]  Session Duration: 6 minutes  Language: Indonesian   ID: #[id number]   Name: Himanshu
Session ID: [id number]  Session Duration: 9 minutes  Language: German   ID: #[id number]   Name: Ector Merrill
Session ID: [id number]  Session Duration: 13 minutes  Language: Panamanian   ID: #[id number]   Name: Fartun
US Guided PIV access-    Ultrasound was used to find the vein which was compressible and without any ultrasound features of an artery or nerve bundle. Skin was cleaned and disinfected prior to IV puncture.  Under real-time ultrasound guidance peripheral access was obtained in the right forearm using 22 G 1.75\" Peripheral IV catheter after 1 attempt(s). Blood return was present and IV flushed without difficulty with no clinical signs of infiltration. IV dressing applied and no immediate complications noted. Patient tolerated the procedure well.        
06/25/25 11:07 PM   Result Value Ref Range    Hemoglobin A1C 5.5 0 - 5.6 %    Estimated Avg Glucose 110 mg/dL   Basic Metabolic Panel w/ Reflex to MG    Collection Time: 06/26/25  6:21 AM   Result Value Ref Range    Sodium 136 136 - 145 mmol/L    Potassium 4.3 3.5 - 5.1 mmol/L    Chloride 114 (H) 98 - 107 mmol/L    CO2 13 (LL) 20 - 29 mmol/L    Anion Gap 9 7 - 16 mmol/L    Glucose 83 70 - 99 mg/dL    BUN 42 (H) 6 - 23 MG/DL    Creatinine 2.01 (H) 0.60 - 1.10 MG/DL    Est, Glom Filt Rate 31 (L) >60 ml/min/1.73m2    Calcium 7.2 (L) 8.8 - 10.2 MG/DL   Lipid Panel    Collection Time: 06/26/25  6:21 AM   Result Value Ref Range    Cholesterol, Total 221 (H) 0 - 200 MG/DL    Triglycerides 160 (H) 0 - 150 MG/DL    HDL 47 40 - 60 MG/DL    LDL Cholesterol 142 (H) 0 - 100 MG/DL    VLDL Cholesterol Calculated 32 (H) 6 - 23 MG/DL    Chol/HDL Ratio 4.7 0.0 - 5.0     CBC with Auto Differential    Collection Time: 06/26/25  6:21 AM   Result Value Ref Range    WBC 8.3 4.3 - 11.1 K/uL    RBC 2.83 (L) 4.05 - 5.2 M/uL    Hemoglobin 8.4 (L) 11.7 - 15.4 g/dL    Hematocrit 26.7 (L) 35.8 - 46.3 %    MCV 94.3 82 - 102 FL    MCH 29.7 26.1 - 32.9 PG    MCHC 31.5 31.4 - 35.0 g/dL    RDW 14.5 11.9 - 14.6 %    Platelets 309 150 - 450 K/uL    MPV 9.7 9.4 - 12.3 FL    nRBC 0.00 0.0 - 0.2 K/uL    Differential Type AUTOMATED      Neutrophils % 69.2 43.0 - 78.0 %    Lymphocytes % 20.5 13.0 - 44.0 %    Monocytes % 6.9 4.0 - 12.0 %    Eosinophils % 2.5 0.5 - 7.8 %    Basophils % 0.5 0.0 - 2.0 %    Immature Granulocytes % 0.4 0.0 - 5.0 %    Neutrophils Absolute 5.75 1.70 - 8.20 K/UL    Lymphocytes Absolute 1.70 0.50 - 4.60 K/UL    Monocytes Absolute 0.57 0.10 - 1.30 K/UL    Eosinophils Absolute 0.21 0.00 - 0.80 K/UL    Basophils Absolute 0.04 0.00 - 0.20 K/UL    Immature Granulocytes Absolute 0.03 0.0 - 0.5 K/UL         Assessment:     Principal Problem:    Symptomatic anemia  Active Problems:    CARLOS EDUARDO (acute kidney injury)    Metabolic acidosis

## 2025-06-30 NOTE — PLAN OF CARE
Problem: Pain  Goal: Verbalizes/displays adequate comfort level or baseline comfort level  6/30/2025 0218 by Justa Avendaño RN  Outcome: Progressing  6/29/2025 2304 by Justa Avendaño RN  Outcome: Progressing     Problem: Chronic Conditions and Co-morbidities  Goal: Patient's chronic conditions and co-morbidity symptoms are monitored and maintained or improved  6/30/2025 0218 by Justa Avendaño RN  Outcome: Progressing  6/29/2025 2304 by Justa Avendaño RN  Outcome: Progressing     Problem: Pain  Goal: Verbalizes/displays adequate comfort level or baseline comfort level  6/30/2025 0218 by Justa Avendaño RN  Outcome: Progressing  6/29/2025 2304 by Justa Avendaño RN  Outcome: Progressing     Problem: Safety - Adult  Goal: Free from fall injury  6/30/2025 0218 by Justa Avendaño RN  Outcome: Progressing  6/29/2025 2304 by Justa Avendaño RN  Outcome: Progressing     Problem: Skin/Tissue Integrity - Adult  Goal: Skin integrity remains intact  6/30/2025 0218 by Justa Avendaño RN  Outcome: Progressing  6/29/2025 2304 by Justa Avendaño RN  Outcome: Progressing     Problem: Musculoskeletal - Adult  Goal: Return mobility to safest level of function  6/30/2025 0218 by Justa Avendaño RN  Outcome: Progressing  6/29/2025 2304 by Justa Avendaño RN  Outcome: Progressing     Problem: Gastrointestinal - Adult  Goal: Maintains or returns to baseline bowel function  Outcome: Progressing     Problem: Hematologic - Adult  Goal: Maintains hematologic stability  Outcome: Progressing

## 2025-07-01 ENCOUNTER — HOSPITAL ENCOUNTER (INPATIENT)
Dept: CT IMAGING | Age: 45
Discharge: HOME | DRG: 683 | End: 2025-07-03
Payer: MEDICAID

## 2025-07-01 ENCOUNTER — APPOINTMENT (OUTPATIENT)
Dept: CT IMAGING | Age: 45
DRG: 683 | End: 2025-07-01
Payer: MEDICAID

## 2025-07-01 VITALS
WEIGHT: 192 LBS | TEMPERATURE: 97.7 F | DIASTOLIC BLOOD PRESSURE: 66 MMHG | SYSTOLIC BLOOD PRESSURE: 106 MMHG | HEIGHT: 58 IN | BODY MASS INDEX: 40.3 KG/M2 | OXYGEN SATURATION: 99 % | RESPIRATION RATE: 18 BRPM | HEART RATE: 77 BPM

## 2025-07-01 LAB
ALBUMIN SERPL-MCNC: 1.3 G/DL (ref 3.5–5)
ALBUMIN/GLOB SERPL: 0.3 (ref 1–1.9)
ALP SERPL-CCNC: 90 U/L (ref 35–104)
ALT SERPL-CCNC: 16 U/L (ref 8–45)
ANION GAP SERPL CALC-SCNC: 9 MMOL/L (ref 7–16)
AST SERPL-CCNC: 24 U/L (ref 15–37)
BASOPHILS # BLD: 0.05 K/UL (ref 0–0.2)
BASOPHILS NFR BLD: 0.8 % (ref 0–2)
BILIRUB SERPL-MCNC: <0.2 MG/DL (ref 0–1.2)
BUN SERPL-MCNC: 43 MG/DL (ref 6–23)
CALCIUM SERPL-MCNC: 7.6 MG/DL (ref 8.8–10.2)
CHLORIDE SERPL-SCNC: 112 MMOL/L (ref 98–107)
CO2 SERPL-SCNC: 17 MMOL/L (ref 20–29)
CREAT SERPL-MCNC: 2.31 MG/DL (ref 0.6–1.1)
DIFFERENTIAL METHOD BLD: ABNORMAL
EOSINOPHIL # BLD: 0.15 K/UL (ref 0–0.8)
EOSINOPHIL NFR BLD: 2.3 % (ref 0.5–7.8)
ERYTHROCYTE [DISTWIDTH] IN BLOOD BY AUTOMATED COUNT: 14 % (ref 11.9–14.6)
GLOBULIN SER CALC-MCNC: 3.6 G/DL (ref 2.3–3.5)
GLUCOSE BLD STRIP.AUTO-MCNC: 107 MG/DL (ref 65–100)
GLUCOSE BLD STRIP.AUTO-MCNC: 112 MG/DL (ref 65–100)
GLUCOSE SERPL-MCNC: 108 MG/DL (ref 70–99)
HCT VFR BLD AUTO: 23.4 % (ref 35.8–46.3)
HGB BLD-MCNC: 7.3 G/DL (ref 11.7–15.4)
IMM GRANULOCYTES # BLD AUTO: 0.03 K/UL (ref 0–0.5)
IMM GRANULOCYTES NFR BLD AUTO: 0.5 % (ref 0–5)
LYMPHOCYTES # BLD: 1.8 K/UL (ref 0.5–4.6)
LYMPHOCYTES NFR BLD: 28.2 % (ref 13–44)
MCH RBC QN AUTO: 30.2 PG (ref 26.1–32.9)
MCHC RBC AUTO-ENTMCNC: 31.2 G/DL (ref 31.4–35)
MCV RBC AUTO: 96.7 FL (ref 82–102)
MONOCYTES # BLD: 0.63 K/UL (ref 0.1–1.3)
MONOCYTES NFR BLD: 9.9 % (ref 4–12)
NEUTS SEG # BLD: 3.73 K/UL (ref 1.7–8.2)
NEUTS SEG NFR BLD: 58.3 % (ref 43–78)
NRBC # BLD: 0 K/UL (ref 0–0.2)
PLATELET # BLD AUTO: 250 K/UL (ref 150–450)
PMV BLD AUTO: 10 FL (ref 9.4–12.3)
POTASSIUM SERPL-SCNC: 3.6 MMOL/L (ref 3.5–5.1)
PROT SERPL-MCNC: 4.9 G/DL (ref 6.3–8.2)
RBC # BLD AUTO: 2.42 M/UL (ref 4.05–5.2)
SERVICE CMNT-IMP: ABNORMAL
SERVICE CMNT-IMP: ABNORMAL
SODIUM SERPL-SCNC: 138 MMOL/L (ref 136–145)
WBC # BLD AUTO: 6.4 K/UL (ref 4.3–11.1)

## 2025-07-01 PROCEDURE — 80053 COMPREHEN METABOLIC PANEL: CPT

## 2025-07-01 PROCEDURE — 85025 COMPLETE CBC W/AUTO DIFF WBC: CPT

## 2025-07-01 PROCEDURE — 6360000002 HC RX W HCPCS: Performed by: RADIOLOGY

## 2025-07-01 PROCEDURE — 82962 GLUCOSE BLOOD TEST: CPT

## 2025-07-01 PROCEDURE — 0TB13ZX EXCISION OF LEFT KIDNEY, PERCUTANEOUS APPROACH, DIAGNOSTIC: ICD-10-PCS | Performed by: RADIOLOGY

## 2025-07-01 PROCEDURE — 36415 COLL VENOUS BLD VENIPUNCTURE: CPT

## 2025-07-01 PROCEDURE — 88305 TISSUE EXAM BY PATHOLOGIST: CPT

## 2025-07-01 PROCEDURE — 77012 CT SCAN FOR NEEDLE BIOPSY: CPT

## 2025-07-01 RX ORDER — FUROSEMIDE 40 MG/1
40 TABLET ORAL 2 TIMES DAILY
Qty: 180 TABLET | Refills: 1 | Status: SHIPPED | OUTPATIENT
Start: 2025-07-01 | End: 2025-07-01

## 2025-07-01 RX ORDER — ATORVASTATIN CALCIUM 80 MG/1
80 TABLET, FILM COATED ORAL NIGHTLY
Qty: 30 TABLET | Refills: 1 | Status: SHIPPED | OUTPATIENT
Start: 2025-07-01

## 2025-07-01 RX ORDER — FUROSEMIDE 40 MG/1
40 TABLET ORAL 2 TIMES DAILY
Qty: 180 TABLET | Refills: 1 | Status: SHIPPED | OUTPATIENT
Start: 2025-07-01

## 2025-07-01 RX ORDER — LABETALOL HYDROCHLORIDE 5 MG/ML
INJECTION, SOLUTION INTRAVENOUS PRN
Status: COMPLETED | OUTPATIENT
Start: 2025-07-01 | End: 2025-07-01

## 2025-07-01 RX ORDER — SODIUM BICARBONATE 650 MG/1
650 TABLET ORAL 4 TIMES DAILY
Qty: 120 TABLET | Refills: 0 | Status: SHIPPED | OUTPATIENT
Start: 2025-07-01

## 2025-07-01 RX ORDER — ATORVASTATIN CALCIUM 80 MG/1
80 TABLET, FILM COATED ORAL NIGHTLY
Qty: 30 TABLET | Refills: 1 | Status: SHIPPED | OUTPATIENT
Start: 2025-07-01 | End: 2025-07-01

## 2025-07-01 RX ORDER — LIDOCAINE HYDROCHLORIDE 10 MG/ML
INJECTION, SOLUTION EPIDURAL; INFILTRATION; INTRACAUDAL; PERINEURAL PRN
Status: COMPLETED | OUTPATIENT
Start: 2025-07-01 | End: 2025-07-01

## 2025-07-01 RX ORDER — FENTANYL CITRATE 50 UG/ML
INJECTION, SOLUTION INTRAMUSCULAR; INTRAVENOUS PRN
Status: COMPLETED | OUTPATIENT
Start: 2025-07-01 | End: 2025-07-01

## 2025-07-01 RX ORDER — HYDRALAZINE HYDROCHLORIDE 20 MG/ML
INJECTION INTRAMUSCULAR; INTRAVENOUS PRN
Status: COMPLETED | OUTPATIENT
Start: 2025-07-01 | End: 2025-07-01

## 2025-07-01 RX ORDER — MIDAZOLAM HYDROCHLORIDE 1 MG/ML
INJECTION, SOLUTION INTRAMUSCULAR; INTRAVENOUS PRN
Status: COMPLETED | OUTPATIENT
Start: 2025-07-01 | End: 2025-07-01

## 2025-07-01 RX ORDER — LISINOPRIL 5 MG/1
5 TABLET ORAL DAILY
Qty: 30 TABLET | Refills: 0 | Status: SHIPPED | OUTPATIENT
Start: 2025-07-02

## 2025-07-01 RX ORDER — PANTOPRAZOLE SODIUM 40 MG/1
40 TABLET, DELAYED RELEASE ORAL
Qty: 30 TABLET | Refills: 3 | Status: SHIPPED | OUTPATIENT
Start: 2025-07-02 | End: 2025-07-01

## 2025-07-01 RX ORDER — SODIUM BICARBONATE 650 MG/1
650 TABLET ORAL 4 TIMES DAILY
Qty: 120 TABLET | Refills: 0 | Status: SHIPPED | OUTPATIENT
Start: 2025-07-01 | End: 2025-07-01

## 2025-07-01 RX ORDER — FERROUS SULFATE 325(65) MG
325 TABLET ORAL
Qty: 30 TABLET | Refills: 3 | Status: SHIPPED | OUTPATIENT
Start: 2025-07-01

## 2025-07-01 RX ORDER — PANTOPRAZOLE SODIUM 40 MG/1
40 TABLET, DELAYED RELEASE ORAL
Qty: 30 TABLET | Refills: 3 | Status: SHIPPED | OUTPATIENT
Start: 2025-07-02

## 2025-07-01 RX ORDER — LISINOPRIL 5 MG/1
5 TABLET ORAL DAILY
Qty: 30 TABLET | Refills: 0 | Status: SHIPPED | OUTPATIENT
Start: 2025-07-02 | End: 2025-07-01

## 2025-07-01 RX ADMIN — LABETALOL HYDROCHLORIDE 5 MG: 5 INJECTION INTRAVENOUS at 11:08

## 2025-07-01 RX ADMIN — MIDAZOLAM 1 MG: 1 INJECTION INTRAMUSCULAR; INTRAVENOUS at 10:45

## 2025-07-01 RX ADMIN — LIDOCAINE HYDROCHLORIDE 7 ML: 10 INJECTION, SOLUTION EPIDURAL; INFILTRATION; INTRACAUDAL; PERINEURAL at 10:55

## 2025-07-01 RX ADMIN — LABETALOL HYDROCHLORIDE 20 MG: 5 INJECTION INTRAVENOUS at 10:01

## 2025-07-01 RX ADMIN — FENTANYL CITRATE 50 MCG: 50 INJECTION, SOLUTION INTRAMUSCULAR; INTRAVENOUS at 10:45

## 2025-07-01 RX ADMIN — MIDAZOLAM 1 MG: 1 INJECTION INTRAMUSCULAR; INTRAVENOUS at 10:01

## 2025-07-01 NOTE — BRIEF OP NOTE
Brief Postoperative Note      Patient: Monserrat Alvarado  YOB: 1980  MRN: [id number]    Date of Procedure: 7/1/2025    Pre-Op Diagnosis: Nephrotic syndrome    Post-Op Diagnosis: Same       : Aaron    CT guided left renal core biopsy. Four 18G core samples obtained. Gelfoam slurry and manual pressure for hemostasis.     Anesthesia: Moderate sedation    Estimated Blood Loss (mL): less than 50     Complications: None    Specimens:   As above    Implants:  * No implants in log *      Drains: * No LDAs found *    Findings:  As above    Electronically signed by John Walker MD on 7/1/2025 at 11:08 AM

## 2025-07-01 NOTE — DISCHARGE SUMMARY
intact.  Psych:  Normal mood and affect.        Time spent in patient discharge and coordination 38 minutes.      Signed:  INGRID Minaya    Part of this note may have been written by using a voice dictation software.  The note has been proof read but may still contain some grammatical/other typographical errors.     H Plasty Text: Given the location of the defect, shape of the defect and the proximity to free margins a H-plasty was deemed most appropriate for repair.  Using a sterile surgical marker, the appropriate advancement arms of the H-plasty were drawn incorporating the defect and placing the expected incisions within the relaxed skin tension lines where possible. The area thus outlined was incised deep to adipose tissue with a #15 scalpel blade. The skin margins were undermined to an appropriate distance in all directions utilizing iris scissors.  The opposing advancement arms were then advanced into place in opposite direction and anchored with interrupted buried subcutaneous sutures.

## 2025-07-01 NOTE — PROGRESS NOTES
Hospitalist Progress Note   Admit Date:  2025  3:04 PM   Name:  Monserrat Alvarado   Age:  45 y.o.  Sex:  female  :  1980   MRN:  [id number]   Room:  [facility]    Presenting/Chief Complaint: Abdominal Pain and Abnormal Lab     Reason(s) for Admission: Acute renal insufficiency [N28.9]  Symptomatic anemia [D64.9]  Acute on chronic anemia [D64.9]     HPI:   Monserrat Alvarado is a 45 y.o. female who presented to the ED for cc abdominal pain, fatigue, and SOB over the past two weeks. Nothing seems to make better or worse. Denies any obvious bleeding     Hg 6.5 from 9.1 in 2024. Denies antiplatelets, anticoagulants, or NSAIDs.      Hx of DM type II, RICHARD, HTN    Subjective & 24hr Events:   Patient seen this morning on rounds lying in bed. Family member at bedside and assisted with translation as  services not currently available due to network connectivity issues. Says she Is feeling well with no acute complaints. Just hungry. Denies vomiting and diarrhea. Also denies family history of kidney problems.    Assessment & Plan:     Symptomatic anemia  - Hg 6.5 on admission with baseline 9  - No GI bleeding, but currently on period (regular not heavy)  - Iron studies consistent with anemia of chronic disease  - Considered IV iron, but will currently differ in setting of likely UTI  - Hg improved 8.4 following 1 unit pRBC  - Monitor with daily CBC and transfuse as needed to keep Hg >7    CARLOS EDUARDO  Concern for nephrotic syndrome  - Baseline Cr 0.7, 2.22 on admission  - UA with 300 protein, small blood,  WBC, and 4+ bacteria  - Urine culture pending  - Continue Rocephin  - Nephrology consulted due to concern for nephrotic syndrome. Checking labs. Added Lasix and lisinopril  - Strict I&O qshift    Volume overload  - Pitting edema in lower extremities and ascites on imaging  - Suspect secondary to hypoalbuminemia and possible nephrotic syndrome  - Continue Lasix BID with strict I&O  - Low sodium 
       Hospitalist Progress Note   Admit Date:  2025  3:04 PM   Name:  Monserrat Alvarado   Age:  45 y.o.  Sex:  female  :  1980   MRN:  [id number]   Room:  [facility]    Presenting/Chief Complaint: Abdominal Pain and Abnormal Lab     Reason(s) for Admission: Acute renal insufficiency [N28.9]  Symptomatic anemia [D64.9]  Acute on chronic anemia [D64.9]     HPI:   Monserrat Alvarado is a 45 y.o. female who presented to the ED for cc abdominal pain, fatigue, and SOB over the past two weeks. Nothing seems to make better or worse. Denies any obvious bleeding     Hg 6.5 from 9.1 in 2024. Denies antiplatelets, anticoagulants, or NSAIDs.      Hx of DM type II, RICHARD, HTN    Subjective & 24hr Events:   Patient seen this morning on rounds lying in bed.  Says she is feeling fine.  Still with pitting edema in her lower extremities.  Renal function remains around the same with acidosis.  Nephrotic range proteinuria, but 24-hour urine protein quantification pending.  Light chains okay.    Assessment & Plan:     Symptomatic anemia  - Hg 6.5 on admission with baseline 9  - No GI bleeding, but currently on period (regular not heavy)  - Iron studies consistent with anemia of chronic disease  - Considered IV iron, but will currently differ in setting of UTI  - Received 1 unit PRBC   - Hemoglobin stable now around 8  - Continue to monitor with daily CBC and transfuse as needed to keep hemoglobin >7  - Vitamin B12 level is low, so started daily supplement    CARLOS EDUARDO  Concern for nephrotic syndrome  - Baseline Cr 0.7, 2.22 on admission  - UA with 300 protein, small blood,  WBC, and 4+ bacteria  - Nephrology consulted due to concern for nephrotic syndrome. Checking labs. Added Lasix and lisinopril  - Renal ultrasound with no hydronephrosis, but changes consistent with chronic renal disease  - Still with significant pitting edema in lower extremities.  Increase Lasix to twice daily with strict intake and output  - Discussed 
       Hospitalist Progress Note   Admit Date:  2025  3:04 PM   Name:  Monserrat Alvarado   Age:  45 y.o.  Sex:  female  :  1980   MRN:  [id number]   Room:  [facility]    Presenting/Chief Complaint: Abdominal Pain and Abnormal Lab     Reason(s) for Admission: Acute renal insufficiency [N28.9]  Symptomatic anemia [D64.9]  Acute on chronic anemia [D64.9]     HPI:   Monserrat Alvarado is a 45 y.o. female who presented to the ED for cc abdominal pain, fatigue, and SOB over the past two weeks. Nothing seems to make better or worse. Denies any obvious bleeding     Hg 6.5 from 9.1 in 2024. Denies antiplatelets, anticoagulants, or NSAIDs.      Hx of DM type II, RICHARD, HTN    Hospital course:   45-year-old female with past medical history of hypertension and type 2 diabetes who presented with progressively worsening fatigue and swelling in her lower extremities.  Found to have significant anemia requiring blood transfusion.  GI consulted, and patient deferred diagnostic studies to outpatient.  Was found to have CARLOS EDUARDO and nephrotic range proteinuria, concerning for nephrotic syndrome.  Nephrology consulted.  Patient started on IV Lasix and low-dose lisinopril.  Renal function remains abnormal.  Phospholipase A2 receptor antibody pending.  Plan for likely kidney biopsy with IR .  Patient also started on high-dose atorvastatin for significant hypercholesterolemia and vitamin B12.  Urine culture positive for E. coli, so patient was treated with 5 days Rocephin.    Subjective & 24hr Events:   Patient seen on rounds this morning with family member at bedside. Says she feels well and wants to go home. Reinforced the severity of her kidney disease and they we need further tests to figure out what is causing the problems. Still with pitting edema in her legs. Also RUE is swollen with 2+ radial pulse and capillary refill. Will check venous duplex studies in legs and RUE. Completed abx. No further symptoms.     Assessment & 
  Admit Date: 6/25/2025      Subjective:      Patient is a 45 y.o. female PMH of DM type II, HTN  C/o abdominal pain, fatigue, and SOB over the past two weeks.  Denies any bleeding. Denies NSAIDs use   No SOB, CP, abd pain or  symptoms, arthralgia or skin rash   Lab: Creat 2.2 up from 0.70 in Feb/2024 CO@ 15 AG 7  Hg 6.5 from 9.1 in Feb 2024. PLT ok 309   Received rbc     Review of Systems  Cardio-vascular: no chest pain, no SOB  GI: no N/V/D  : no dysuria, no hematuria    Objective:     Patient Vitals for the past 8 hrs:   BP Temp Temp src Pulse Resp SpO2   06/27/25 1104 129/89 97.5 °F (36.4 °C) Oral 82 18 98 %   06/27/25 0656 (!) 159/84 98.2 °F (36.8 °C) Oral 83 20 100 %   06/27/25 0419 (!) 143/83 98.2 °F (36.8 °C) Axillary 79 17 100 %     06/27 0701 - 06/27 1900  In: 240 [P.O.:240]  Out: -       Physical Exam:   Not in ROSEMARY, Alert/O x3  Lung: clear  CV: RR, no gallop or rub  Abd: soft, not tender  Ext: 1 + edema           Data Review   Recent Results (from the past 8 hours)   Basic Metabolic Panel w/ Reflex to MG    Collection Time: 06/27/25  5:33 AM   Result Value Ref Range    Sodium 137 136 - 145 mmol/L    Potassium 4.2 3.5 - 5.1 mmol/L    Chloride 114 (H) 98 - 107 mmol/L    CO2 14 (LL) 20 - 29 mmol/L    Anion Gap 9 7 - 16 mmol/L    Glucose 114 (H) 70 - 99 mg/dL    BUN 43 (H) 6 - 23 MG/DL    Creatinine 2.09 (H) 0.60 - 1.10 MG/DL    Est, Glom Filt Rate 29 (L) >60 ml/min/1.73m2    Calcium 7.1 (L) 8.8 - 10.2 MG/DL   CBC with Auto Differential    Collection Time: 06/27/25  5:33 AM   Result Value Ref Range    WBC 7.4 4.3 - 11.1 K/uL    RBC 2.48 (L) 4.05 - 5.2 M/uL    Hemoglobin 7.4 (L) 11.7 - 15.4 g/dL    Hematocrit 23.2 (L) 35.8 - 46.3 %    MCV 93.5 82 - 102 FL    MCH 29.8 26.1 - 32.9 PG    MCHC 31.9 31.4 - 35.0 g/dL    RDW 14.6 11.9 - 14.6 %    Platelets 280 150 - 450 K/uL    MPV 9.9 9.4 - 12.3 FL    nRBC 0.00 0.0 - 0.2 K/uL    Differential Type AUTOMATED      Neutrophils % 64.0 43.0 - 78.0 %    Lymphocytes % 
  Admit Date: 6/25/2025      Subjective:      Patient is a 45 y.o. female PMH of DM type II, HTN  C/o abdominal pain, fatigue, and SOB over the past two weeks.  Denies any bleeding. Denies NSAIDs use   No SOB, CP, abd pain or  symptoms, arthralgia or skin rash   Lab: Creat 2.2 up from 0.70 in Feb/2024 CO@ 15 AG 7  Hg 6.5 from 9.1 in Feb 2024. PLT ok 309   Received rbc     Review of Systems  Cardio-vascular: no chest pain, no SOB  GI: no N/V/D  : no dysuria, no hematuria    Objective:     Patient Vitals for the past 8 hrs:   BP Temp Temp src Pulse Resp SpO2   06/28/25 0717 (!) 147/82 97.9 °F (36.6 °C) Oral 74 18 100 %   06/28/25 0322 137/80 98.4 °F (36.9 °C) Axillary 69 17 99 %     No intake/output data recorded.      Physical Exam:   Not in ROSEMARY, Alert/O x3  Lung: clear  CV: RR, no gallop or rub  Abd: soft, not tender  Ext: 2 + edema           Data Review   Recent Results (from the past 8 hours)   POCT Glucose    Collection Time: 06/28/25  7:17 AM   Result Value Ref Range    POC Glucose 123 (H) 65 - 100 mg/dL    Performed by: Qing            Assessment:     Principal Problem:    Symptomatic anemia  Active Problems:    CARLOS EDUARDO (acute kidney injury)    Metabolic acidosis    Anasarca    HTN (hypertension)    Diabetes mellitus, type II (HCC)  Resolved Problems:    * No resolved hospital problems. *      Plan:   CARLOS EDUARDO   Creat 0.70 -->2.22   UA: SG 1.012 prot 300 presence of rbc and wbc   Nephrotic. 24 urine protein quantification pending   Urine Cx + for RNR ( but no urinary symptoms)   Active sediment ? Vs infection with nephrotic syndrome: RPGN?  May be underline diabetic nephropathy   Low Hb with relative low iron, not consistent with MAHA   Serology neg  S. Light chains OK   ,proteinuria quantification , protein IF pending   Lab of today pending   May need kidney bx ( once anemia stable) and finishing ATB treatment      Non AG metabolic acidosis?  Na HCO3 supplement      Anemia   Relative iron def.     II DM    
  Admit Date: 6/25/2025      Subjective:      Patient is a 45 y.o. female PMH of DM type II, HTN  C/o abdominal pain, fatigue, and SOB over the past two weeks.  Denies any bleeding. Denies NSAIDs use   No SOB, CP, abd pain or  symptoms, arthralgia or skin rash   Lab: Creat 2.2 up from 0.70 in Feb/2024 CO@ 15 AG 7  Hg 6.5 from 9.1 in Feb 2024. PLT ok 309   Received rbc     Review of Systems  Cardio-vascular: no chest pain, no SOB  GI: no N/V/D  : no dysuria, no hematuria    Objective:     Patient Vitals for the past 8 hrs:   BP Temp Temp src Pulse Resp SpO2   06/29/25 1117 (!) 157/88 97.3 °F (36.3 °C) Oral 78 16 98 %   06/29/25 0714 (!) 150/84 97.3 °F (36.3 °C) Oral 81 16 --   06/29/25 0356 (!) 145/77 97.3 °F (36.3 °C) Oral 80 17 99 %     No intake/output data recorded.      Physical Exam:   Not in ROSEMARY, Alert/O x3  Lung: clear  CV: RR, no gallop or rub  Abd: soft, not tender  Ext: 2 + edema           Data Review   Recent Results (from the past 8 hours)   Basic Metabolic Panel w/ Reflex to MG    Collection Time: 06/29/25  4:20 AM   Result Value Ref Range    Sodium 137 136 - 145 mmol/L    Potassium 3.6 3.5 - 5.1 mmol/L    Chloride 112 (H) 98 - 107 mmol/L    CO2 15 (L) 20 - 29 mmol/L    Anion Gap 10 7 - 16 mmol/L    Glucose 110 (H) 70 - 99 mg/dL    BUN 44 (H) 6 - 23 MG/DL    Creatinine 2.22 (H) 0.60 - 1.10 MG/DL    Est, Glom Filt Rate 27 (L) >60 ml/min/1.73m2    Calcium 7.4 (L) 8.8 - 10.2 MG/DL   CBC with Auto Differential    Collection Time: 06/29/25  4:20 AM   Result Value Ref Range    WBC 7.2 4.3 - 11.1 K/uL    RBC 2.40 (L) 4.05 - 5.2 M/uL    Hemoglobin 7.1 (L) 11.7 - 15.4 g/dL    Hematocrit 21.9 (L) 35.8 - 46.3 %    MCV 91.3 82 - 102 FL    MCH 29.6 26.1 - 32.9 PG    MCHC 32.4 31.4 - 35.0 g/dL    RDW 14.0 11.9 - 14.6 %    Platelets 264 150 - 450 K/uL    MPV 10.0 9.4 - 12.3 FL    nRBC 0.00 0.0 - 0.2 K/uL    Differential Type AUTOMATED      Neutrophils % 59.3 43.0 - 78.0 %    Lymphocytes % 28.0 13.0 - 44.0 %    
 Notified Dr Dye , IR said biopsy would be on the schedule for tomorrow. Pt & daughter made aware  
/Navigator rounded in person and assessed patient's language needs.  Resources are being used to patient's satisfaction.  services for Ramesh Dye MD , Navigation assistance and direct phone number were provided.      Dee Trevino  Senior -Navigator ([phone])           
Patient/caregivers speak Omani as their preferred language for their healthcare communication.     To reach Language Services Navigator call Dee Trevino at ([phone])  ___________________________  [employer] Ipads are available for immediate Video remote  access or for Telephonic access call ( [phone])  For additional Language services resources  [email or web address]  [email or web address]  ([phone])    
Patient/caregivers speak Samoan as their preferred language for their healthcare communication.     To reach Language Services Navigator call Dee Trevino at ([phone])  ___________________________  AMN Ipads are available for immediate Video remote  access or for Telephonic access call ( [phone])  For additional Language services resources  [email or web address]  [email or web address]  ([phone])    
Patient/caregivers speak Tristanian as their preferred language for their healthcare communication.     To reach Language Services Navigator call Dee Trevino at ([phone])  ___________________________  AMN Ipads are available for immediate Video remote  access or for Telephonic access call ( [phone])  For additional Language services resources  [email or web address]  [email or web address]  ([phone])    
Smyth of care assumed by this nurse. Patient resting in bed, no s/sx of distress, no needs or concerns voiced at this time. Call light within reach, bed in lowest position.    
We were notified by Rosa Gallegos that patient prefers to  meds at [facility]. Rx's cancelled with our pharmacy and sent to pt preferred  
effervescent tablet 40 mEq  40 mEq Oral PRN    Or    potassium chloride 10 mEq/100 mL IVPB (Peripheral Line)  10 mEq IntraVENous PRN    magnesium sulfate 2000 mg in 50 mL IVPB premix  2,000 mg IntraVENous PRN    melatonin tablet 3 mg  3 mg Oral Nightly PRN    polyethylene glycol (GLYCOLAX) packet 17 g  17 g Oral Daily PRN    bisacodyl (DULCOLAX) suppository 10 mg  10 mg Rectal Daily PRN    famotidine (PEPCID) tablet 10 mg  10 mg Oral Daily PRN    aluminum & magnesium hydroxide-simethicone (MAALOX PLUS) 200-200-20 MG/5ML suspension 30 mL  30 mL Oral Q6H PRN    acetaminophen (TYLENOL) tablet 650 mg  650 mg Oral Q6H PRN    Or    acetaminophen (TYLENOL) suppository 650 mg  650 mg Rectal Q6H PRN         Objective:     Vitals:    06/29/25 1912 06/29/25 2355 06/30/25 0342 06/30/25 0719   BP: (!) 140/79 (!) 166/93 (!) 149/88 (!) 161/88   Pulse: 88 92 91 91   Resp: 17 17 17 18   Temp: 98.2 °F (36.8 °C) 97.3 °F (36.3 °C) 98.1 °F (36.7 °C) 97.2 °F (36.2 °C)   TempSrc: Oral Oral Axillary Oral   SpO2: 97% 95% 97% 98%   Weight: 87.1 kg (192 lb)      Height:         Intake and Output:   06/28 1901 - 06/30 0700  In: 300 [P.O.:250]  Out: 2200 [Urine:2200]  06/30 0701 - 06/30 1900  In: 240 [P.O.:240]  Out: -     Physical Exam:   Constitutional:  the patient is well developed and in no acute distress  HEENT:  Sclera clear, pupils equal, oral mucosa moist  Lungs: Clear  Cardiovascular:  RRR without M,G,R  Abd/GI: soft and non-tender; with positive bowel sounds.  Ext: warm without cyanosis. There is no lower leg edema.  Musculoskeletal: moves all four extremities with equal strength      Vascular duplex upper extremity venous left   Final Result   Left cephalic vein thrombus.               Electronically signed by YASMANY RUSSELL      US RETROPERITONEAL COMPLETE   Final Result   Findings suggestive of chronic medical renal disease.      There is no hydronephrosis or renal mass.                  Electronically signed by Yasmany 
venous left   Final Result   Left cephalic vein thrombus.               Electronically signed by RODRÍGUEZ RUSSELL      US RETROPERITONEAL COMPLETE   Final Result   Findings suggestive of chronic medical renal disease.      There is no hydronephrosis or renal mass.                  Electronically signed by Rodríguez Rowland      CT ABDOMEN PELVIS WO CONTRAST Additional Contrast? None   Final Result      Air within the urinary bladder, possibly iatrogenic. Urinalysis may be obtained   for further evaluation.      Diffuse abdominal and pelvic anasarca.      Mild abdominopelvic ascites.      Myocardial hyperattenuation concerning for anemia.      Etiology of abdominal pain is not well elucidated.            Electronically signed by Juliano Hogan MD      CT BIOPSY RENAL    (Results Pending)     [unfilled]    LAB  Recent Labs     06/29/25  0420 06/30/25  0557 07/01/25  0623   WBC 7.2 6.3 6.4   HGB 7.1* 7.0* 7.3*   HCT 21.9* 21.1* 23.4*    255 250     Recent Labs     06/29/25 0420 06/30/25  0557 07/01/25  0623    138 138   K 3.6 3.2* 3.6   * 112* 112*   CO2 15* 18* 17*   BUN 44* 44* 43*   CREATININE 2.22* 2.14* 2.31*   MG  --  2.5*  --        No results for input(s): \"PH\", \"PCO2\", \"PO2\", \"HCO3\" in the last 72 hours.            Plan:  (Medical Decision Making)     CARLOS EDUARDO with nephrotic syndrome  Creat 0.70 -->2.22   UA: SG 1.012 prot 300 presence of rbc and wbc   24 hours proteinuria shows 11.5  g  PLA2R pending  MARIZOL negative   MPO, proteinase 3 AB-  P ANCA and C ANCA negative  Light chains within normal limit  C3-C4 within normal limit  Status post kidney biopsy today  On Lasix 40 mg twice daily  Lisinopril 5 mg  Renal function stable with creatinine at 2.31       Non AG metabolic acidosis?  Na HCO3 supplement      Anemia   Relative iron def.     II DM      HTN      Thx   S/p rbc transfusion    Discussed plan of care with daughter at bedside    Okay to be discharged from nephrology standpoint. Will f/w 
crackles or wheezing.   GI: Abdomen soft, non-tender and non-distended.   EXT: 2+ pitting edema in bilateral lower extremities.        I have personally reviewed labs and tests:  Recent Labs:  Recent Results (from the past 48 hours)   PROTEIN/CREAT RATIO, 24 HR URINE    Collection Time: 06/27/25 12:50 PM   Result Value Ref Range    Collection Duration 24 hr    Volume 2075 mL    Creatinine, 24H Ur 629 (L) 740.0 - 1,570.0 MG/24HR    Protein, 24H Urine 30579 (H) 0 - 150 mg/24hr    CREATININE/PROTEIN RATIO, 24 HR URINE 18.44 RATIO   POCT Glucose    Collection Time: 06/27/25  4:13 PM   Result Value Ref Range    POC Glucose 117 (H) 65 - 100 mg/dL    Performed by: Smailex    Basic Metabolic Panel w/ Reflex to MG    Collection Time: 06/27/25  5:41 PM   Result Value Ref Range    Sodium 137 136 - 145 mmol/L    Potassium 3.9 3.5 - 5.1 mmol/L    Chloride 113 (H) 98 - 107 mmol/L    CO2 15 (L) 20 - 29 mmol/L    Anion Gap 10 7 - 16 mmol/L    Glucose 113 (H) 70 - 99 mg/dL    BUN 42 (H) 6 - 23 MG/DL    Creatinine 2.06 (H) 0.60 - 1.10 MG/DL    Est, Glom Filt Rate 30 (L) >60 ml/min/1.73m2    Calcium 7.4 (L) 8.8 - 10.2 MG/DL   Hemoglobin and Hematocrit    Collection Time: 06/27/25  5:41 PM   Result Value Ref Range    Hemoglobin 8.0 (L) 11.7 - 15.4 g/dL    Hematocrit 24.7 (L) 35.8 - 46.3 %   POCT Glucose    Collection Time: 06/27/25  8:52 PM   Result Value Ref Range    POC Glucose 137 (H) 65 - 100 mg/dL    Performed by: Bonita    POCT Glucose    Collection Time: 06/28/25  7:17 AM   Result Value Ref Range    POC Glucose 123 (H) 65 - 100 mg/dL    Performed by: Smailex    Basic Metabolic Panel w/ Reflex to MG    Collection Time: 06/28/25  9:00 AM   Result Value Ref Range    Sodium 137 136 - 145 mmol/L    Potassium 3.8 3.5 - 5.1 mmol/L    Chloride 112 (H) 98 - 107 mmol/L    CO2 16 (L) 20 - 29 mmol/L    Anion Gap 9 7 - 16 mmol/L    Glucose 116 (H) 70 - 99 mg/dL    BUN 41 (H) 6 - 23 MG/DL    Creatinine 2.14 (H) 0.60 - 
mg Oral Q6H PRN    Or    acetaminophen (TYLENOL) suppository 650 mg  650 mg Rectal Q6H PRN    pantoprazole (PROTONIX) 40 mg in sodium chloride (PF) 0.9 % 10 mL injection  40 mg IntraVENous Daily    hydrALAZINE (APRESOLINE) injection 20 mg  20 mg IntraVENous Q6H PRN    cefTRIAXone (ROCEPHIN) 2,000 mg in sodium chloride 0.9 % 50 mL IVPB (addEASE)  2,000 mg IntraVENous Q24H    lisinopril (PRINIVIL;ZESTRIL) tablet 2.5 mg  2.5 mg Oral Daily    furosemide (LASIX) injection 40 mg  40 mg IntraVENous Daily       Signed:  BRIE AVELAR MD    Part of this note may have been written by using a voice dictation software.  The note has been proof read but may still contain some grammatical/other typographical errors.

## 2025-07-01 NOTE — PLAN OF CARE
Problem: Chronic Conditions and Co-morbidities  Goal: Patient's chronic conditions and co-morbidity symptoms are monitored and maintained or improved  6/30/2025 2201 by Justa Avendaño RN  Outcome: Progressing  6/30/2025 0911 by Marie Stack RN  Outcome: Progressing     Problem: Pain  Goal: Verbalizes/displays adequate comfort level or baseline comfort level  6/30/2025 2201 by Justa Avendaño RN  Outcome: Progressing  6/30/2025 0911 by Marie Stack RN  Outcome: Progressing     Problem: Safety - Adult  Goal: Free from fall injury  6/30/2025 2201 by Justa Avendaño RN  Outcome: Progressing  6/30/2025 0911 by Marie Stack RN  Outcome: Progressing     Problem: Respiratory - Adult  Goal: Achieves optimal ventilation and oxygenation  6/30/2025 2201 by Justa Avendaño RN  Outcome: Progressing  6/30/2025 0911 by Marie Stack RN  Outcome: Progressing     Problem: Skin/Tissue Integrity - Adult  Goal: Skin integrity remains intact  6/30/2025 2201 by Justa Avendaño RN  Outcome: Progressing  6/30/2025 0911 by Marie Stack RN  Outcome: Progressing  Flowsheets (Taken 6/30/2025 0858)  Skin Integrity Remains Intact: Monitor for areas of redness and/or skin breakdown     Problem: Musculoskeletal - Adult  Goal: Return mobility to safest level of function  6/30/2025 2201 by Justa Avendaño RN  Outcome: Progressing  6/30/2025 0911 by Marie Stack RN  Outcome: Progressing

## 2025-07-01 NOTE — DISCHARGE INSTRUCTIONS
Was found to have CARLOS EDUARDO and nephrotic range proteinuria, concerning for nephrotic syndrome.  Nephrology consulted.  Patient started on IV Lasix and low-dose lisinopril.  PLA2R pending, MARIZOL negative, MPO, proteinase 3 AB-, P ANCA and C ANCA negative, Light chains within normal limit, C3-C4 within normal limit Phospholipase A2 receptor antibody pending. Renal function stabilized. She underwent kidney biopsy with IR 7/1.  Patient also started on high-dose atorvastatin for significant hypercholesterolemia and vitamin B12.  Urine culture positive for E. coli, so patient was treated with 5 days Rocephin which she completed.  Throughout her hospitalization iron studies were ordered which was consistent with anemia of chronic disease.  She was given IV iron during her admission.  Notably she also had venous Dopplers of her upper extremities and lower extremities only to reveal a left cephalic vein thrombosis considered to be superficial and not warranting anticoagulant.  She was deemed stable to discharge home after her renal bx on continued diuretics, bicarb, lisinopril, statin, oral iron.  She should follow-up closely with nephrology for the results of her biopsy.     Follow up labs/diagnostics (ultimately defer to outpatient provider):  BMP  Kidney bx results  CBC    GI team will call for outpatient appointment.     Nephrology will call with biopsy results. You will need to call if you haven't heard form them by Friday.

## 2025-07-01 NOTE — PRE SEDATION
Sedation Pre-Procedure Note    Patient Name: Monserrat Alvarado   YOB: 1980  Room/Bed: [facility]  Medical Record Number: [id number]  Date: 7/1/2025   Time: 10:19 AM       Indication:  Nephrotic syndrome    Consent: I have discussed with the patient and/or the patient representative the indication, alternatives, and the possible risks and/or complications of the planned procedure and the anesthesia methods. The patient and/or patient representative appear to understand and agree to proceed.    Vital Signs:   Vitals:    07/01/25 0849   BP: (!) 187/92   Pulse: 90   Resp: 20   Temp: 97.9 °F (36.6 °C)   SpO2: 98%       Past Medical History:   has no past medical history on file.    Past Surgical History:   has no past surgical history on file.    Medications:   Scheduled Meds:    ferric gluconate  125 mg IntraVENous Q24H    sodium bicarbonate  650 mg Oral 4x Daily    lisinopril  5 mg Oral Daily    pantoprazole  40 mg Oral QAM AC    furosemide  40 mg IntraVENous BID    vitamin B-12  1,000 mcg Oral Daily    insulin lispro  0-4 Units SubCUTAneous 4x Daily AC & HS    atorvastatin  80 mg Oral Nightly    sodium chloride flush  5-40 mL IntraVENous 2 times per day     Continuous Infusions:    dextrose      sodium chloride      sodium chloride       PRN Meds: glucose, dextrose bolus **OR** dextrose bolus, glucagon (rDNA), dextrose, sodium chloride, sodium chloride flush, sodium chloride, potassium chloride **OR** potassium alternative oral replacement **OR** potassium chloride, magnesium sulfate, melatonin, polyethylene glycol, bisacodyl, famotidine, aluminum & magnesium hydroxide-simethicone, acetaminophen **OR** acetaminophen  Home Meds:   Prior to Admission medications    Medication Sig Start Date End Date Taking? Authorizing Provider   empagliflozin (JARDIANCE) 25 MG tablet Take 1 tablet by mouth daily   Yes Provider, MD Tristian   ferrous sulfate (IRON 325) 325 (65 Fe) MG tablet Take 1 tablet by mouth daily (with

## 2025-07-01 NOTE — CARE COORDINATION
Pt is medically stable for discharge today and will return home with family. CM attempted to schedule hospital follow up with [facility]. According to staff, there are no more hospital follow up appts available. Staff reported they are able to accept walk-ins, first come first served. Pt aware and verbalized understanding. No other discharge needs identified.    Family to transport. Telephonic interpreting services provided by Dee Richey   06/26/25 1300   Service Assessment   Patient Orientation Alert and Oriented   Cognition Alert   History Provided By Patient;Child/Family   Primary Caregiver Self   Accompanied By/Relationship Daughter- Tiffany   Support Systems Children   Patient's Healthcare Decision Maker is: Legal Next of Kin   PCP Verified by CM Yes  (Dinh Rosario-DARLYN ISSA  Last appt 6/18/25)   Last Visit to PCP Within last 3 months   Prior Functional Level Independent in ADLs/IADLs   Current Functional Level Independent in ADLs/IADLs   Can patient return to prior living arrangement Yes   Ability to make needs known: Good   Family able to assist with home care needs: Yes   Would you like for me to discuss the discharge plan with any other family members/significant others, and if so, who? No  (Unless indicated or necessary.)   Financial Resources Financial Counseling   Community Resources None   CM/SW Referral Other (see comment)  (Discharge planning)   Social/Functional History   Lives With Daughter   Type of Home Trailer   Home Access Stairs to enter with rails   Entrance Stairs - Number of Steps 5   Home Equipment Cane - Quad   Receives Help From Family   Prior Level of Assist for ADLs Independent   Ambulation Assistance Independent   Prior Level of Assist for Transfers Independent   Active  No   Mode of Transportation Family   Occupation Unemployed  (Financially supported by family.)   Discharge Planning   Type of Residence Trailer/Mobile Home   Living Arrangements Family Members   Current

## 2025-07-01 NOTE — DIABETES MGMT
Patient seen for assessment regarding diabetes management by diabetes educator. Patient daughter at bedside.  Patient primary language Japanese, interpretor used for education.  Admitting blood glucose 89. A1c 5.5. Patient has a past medical history of HTN, DM type 2, nephrotic syndrome. Patient states they have been living with diabetes for 1 year and voices positive family history of diabetes. Patient states they are currently taking Metformin and Jardiance at home for management of diabetes. Patient voices that they have not experienced hypoglycemia in the past. Educated regarding hypoglycemia signs, symptoms, and treatment. Patient has not attended formal diabetes education in the past. Patient reports no difficulty with affording their diabetic supplies. Patient states PCP is [facility].    Patient given educational material in written Japanese, \"Diabetes Self-Management: A Patient Teaching Guide\", which was reviewed with patient. Explained basic physiology of diabetes, as well as causes, signs and symptoms, and treatments for hypoglycemia and hyperglycemia. Described the effects of poor glycemic control and the development of long-term complications such as renal, eye, nerve, and cardiovascular disease. Patient denies numbness and tingling in feet. Described proper diabetic foot care and the importance of checking feet daily. Reviewed effects of sweetened beverages on glycemic control and discussed alternative beverages to help improve glycemic control. Educated re: effects of carbohydrates on blood glucose, the \"plate method\" of healthy meal planning, basics of healthy meal plan, Consistent Carbohydrate Diet, discussed the basics of carb counting and how to read a nutrition label. Educated patient regarding the benefits of physical activity (as cleared by provider) on glycemic control. Also explained the relationship between hyperglycemia and infection and delayed healing. Discussed target goals for blood

## 2025-07-01 NOTE — OR NURSING
TRANSFER - OUT REPORT:     Verbal report given to Lynn ASHLEY  on Monserrat Alvarado  being transferred to IR Recovery for routine progression of patient care      Report consisted of patient’s Situation, Background, Assessment and Recommendations(SBAR).      Information from the following report(s) SBAR, Procedure Summary, and MAR was reviewed with the receiving nurse.     Opportunity for questions and clarification was provided.      Conscious Sedation:    50 Mcg of Fentanyl administered   1 Mg of Versed administered     Pt tolerated procedure well.      Left lower Back bandaid-type dressing clean, dry, intact, and nontender    VITALS:  /64   Pulse 86   Temp 97.9 °F (36.6 °C) (Infrared)   Resp 18   Ht 1.473 m (4' 10\")   Wt 87.1 kg (192 lb)   SpO2 99%   BMI 40.13 kg/m²

## 2025-07-02 LAB
Lab: NORMAL
Lab: NORMAL
REFERENCE LAB: NORMAL

## 2025-07-04 LAB — CRYOGLOB SER QL 1D COLD INC: NORMAL

## 2025-07-11 ENCOUNTER — TELEPHONE (OUTPATIENT)
Dept: GASTROENTEROLOGY | Age: 45
End: 2025-07-11

## 2025-08-09 ENCOUNTER — TELEPHONE (OUTPATIENT)
Dept: GASTROENTEROLOGY | Age: 45
End: 2025-08-09